# Patient Record
Sex: MALE | Race: BLACK OR AFRICAN AMERICAN | NOT HISPANIC OR LATINO | Employment: UNEMPLOYED | URBAN - METROPOLITAN AREA
[De-identification: names, ages, dates, MRNs, and addresses within clinical notes are randomized per-mention and may not be internally consistent; named-entity substitution may affect disease eponyms.]

---

## 2019-11-19 ENCOUNTER — TELEPHONE (OUTPATIENT)
Dept: OTHER | Facility: OUTPATIENT CENTER | Age: 24
End: 2019-11-19
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
## 2019-11-20 NOTE — TELEPHONE ENCOUNTER
.Per: BETTY boston/ DEENA/MA  Copay$ 0   Ded$ 0; Met$ 0   Coins 0%    Out of Pocket Max$ 0 ; Met$ 0     Psych testing require auth ([id number])? no  Extended therapy require auth ([id number])?  no    Exclusions:   Family Therapy ([id number])  NO    Marriage couple counseling  YES   Transgender/Gender Dysphoria no   CSB no   Sexual dysfunction no    Patient Contacted about benefits:  SPOKE TO PATIENT RE: INSU  Date contacted: 11/20/2019

## 2019-12-03 ENCOUNTER — TELEPHONE (OUTPATIENT)
Dept: OTHER | Facility: OUTPATIENT CENTER | Age: 24
End: 2019-12-03

## 2019-12-03 ENCOUNTER — OFFICE VISIT (OUTPATIENT)
Dept: OTHER | Facility: OUTPATIENT CENTER | Age: 24
End: 2019-12-03
Payer: COMMERCIAL

## 2019-12-03 DIAGNOSIS — F32.1 CURRENT MODERATE EPISODE OF MAJOR DEPRESSIVE DISORDER WITHOUT PRIOR EPISODE (H): Primary | ICD-10-CM

## 2019-12-03 DIAGNOSIS — F41.1 GENERALIZED ANXIETY DISORDER: ICD-10-CM

## 2019-12-03 DIAGNOSIS — F12.10 MARIJUANA ABUSE: ICD-10-CM

## 2019-12-03 NOTE — TELEPHONE ENCOUNTER
Pt requested a transfer based on feeling like they did not connect with their initial provider during their first session. This writer scheduled pt with Dr. France and let them know one transfer is typically fine with no questions asked. They were appreciative of the transfer and understood the scheduling policy for transferring providers in the future.
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
## 2019-12-04 PROBLEM — F32.1 CURRENT MODERATE EPISODE OF MAJOR DEPRESSIVE DISORDER WITHOUT PRIOR EPISODE (H): Status: ACTIVE | Noted: 2019-12-04

## 2019-12-09 ENCOUNTER — OFFICE VISIT (OUTPATIENT)
Dept: OTHER | Facility: OUTPATIENT CENTER | Age: 24
End: 2019-12-09
Payer: COMMERCIAL

## 2019-12-09 DIAGNOSIS — F41.1 GENERALIZED ANXIETY DISORDER: Primary | ICD-10-CM

## 2019-12-09 DIAGNOSIS — F32.1 CURRENT MODERATE EPISODE OF MAJOR DEPRESSIVE DISORDER WITHOUT PRIOR EPISODE (H): ICD-10-CM

## 2019-12-09 NOTE — PROGRESS NOTES
I did not personally see the patient.  I reviewed and agree with the assessment and plan as documented in this note. Bailey Moffett, PhD, LP

## 2019-12-19 ENCOUNTER — OFFICE VISIT (OUTPATIENT)
Dept: OTHER | Facility: OUTPATIENT CENTER | Age: 24
End: 2019-12-19
Payer: COMMERCIAL

## 2019-12-19 DIAGNOSIS — F32.1 CURRENT MODERATE EPISODE OF MAJOR DEPRESSIVE DISORDER WITHOUT PRIOR EPISODE (H): ICD-10-CM

## 2019-12-19 DIAGNOSIS — F41.1 GENERALIZED ANXIETY DISORDER: Primary | ICD-10-CM
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
## 2020-01-06 ENCOUNTER — OFFICE VISIT (OUTPATIENT)
Dept: OTHER | Facility: OUTPATIENT CENTER | Age: 25
End: 2020-01-06
Payer: COMMERCIAL

## 2020-01-06 DIAGNOSIS — F41.1 GENERALIZED ANXIETY DISORDER: Primary | ICD-10-CM

## 2020-01-06 DIAGNOSIS — F32.1 CURRENT MODERATE EPISODE OF MAJOR DEPRESSIVE DISORDER WITHOUT PRIOR EPISODE (H): ICD-10-CM
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
## 2020-02-12 ENCOUNTER — OFFICE VISIT (OUTPATIENT)
Dept: OTHER | Facility: OUTPATIENT CENTER | Age: 25
End: 2020-02-12
Payer: COMMERCIAL

## 2020-02-12 DIAGNOSIS — F41.1 GENERALIZED ANXIETY DISORDER: Primary | ICD-10-CM

## 2020-02-12 DIAGNOSIS — F32.1 CURRENT MODERATE EPISODE OF MAJOR DEPRESSIVE DISORDER WITHOUT PRIOR EPISODE (H): ICD-10-CM
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
## 2020-08-18 ENCOUNTER — TELEPHONE (OUTPATIENT)
Dept: OTHER | Facility: OUTPATIENT CENTER | Age: 25
End: 2020-08-18

## 2020-08-18 NOTE — TELEPHONE ENCOUNTER
----- Message from Monique France, PhD sent at 8/18/2020 11:45 AM CDT -----  Janes Nunn,     I have not met with this pt since Feb so I am not sure what this is about. Could you please let him know that he is welcome to schedule an appt in one of the open slots or be placed on the wait list?    -Devaughn    ----- Message -----  From: Arline Cox, [facility]  Sent: 8/18/2020   9:11 AM CDT  To: Monique France, PhD    Chuckie Cantor asks that you give them a call if possible in the next hour.  Will be having a discussion with their bosses today and are looking for some guidance.    Arline

## 2020-10-08 ENCOUNTER — VIRTUAL VISIT (OUTPATIENT)
Dept: PSYCHOLOGY | Facility: CLINIC | Age: 25
End: 2020-10-08
Payer: COMMERCIAL

## 2020-10-08 DIAGNOSIS — F32.0 CURRENT MILD EPISODE OF MAJOR DEPRESSIVE DISORDER WITHOUT PRIOR EPISODE (H): ICD-10-CM

## 2020-10-08 DIAGNOSIS — F41.1 GENERALIZED ANXIETY DISORDER: Primary | ICD-10-CM

## 2020-10-08 PROCEDURE — 90834 PSYTX W PT 45 MINUTES: CPT | Mod: 95 | Performed by: PSYCHOLOGIST

## 2020-10-08 PROCEDURE — 99207 PR NO CHARGE LOS: CPT | Performed by: PSYCHOLOGIST
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
## 2020-12-21 ENCOUNTER — VIRTUAL VISIT (OUTPATIENT)
Dept: PSYCHOLOGY | Facility: CLINIC | Age: 25
End: 2020-12-21
Payer: COMMERCIAL

## 2020-12-21 DIAGNOSIS — F41.1 GENERALIZED ANXIETY DISORDER: Primary | ICD-10-CM

## 2020-12-21 DIAGNOSIS — F32.0 CURRENT MILD EPISODE OF MAJOR DEPRESSIVE DISORDER WITHOUT PRIOR EPISODE (H): ICD-10-CM

## 2020-12-21 PROCEDURE — 99207 PR NO CHARGE LOS: CPT | Performed by: PSYCHOLOGIST

## 2020-12-21 PROCEDURE — 90837 PSYTX W PT 60 MINUTES: CPT | Mod: 95 | Performed by: PSYCHOLOGIST
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
## 2021-01-04 ENCOUNTER — HEALTH MAINTENANCE LETTER (OUTPATIENT)
Age: 26
End: 2021-01-04

## 2021-10-10 ENCOUNTER — HEALTH MAINTENANCE LETTER (OUTPATIENT)
Age: 26
End: 2021-10-10

## 2021-11-15 PROCEDURE — 87186 SC STD MICRODIL/AGAR DIL: CPT | Mod: ORL | Performed by: PHYSICIAN ASSISTANT

## 2021-11-15 PROCEDURE — 87070 CULTURE OTHR SPECIMN AEROBIC: CPT | Mod: ORL | Performed by: PHYSICIAN ASSISTANT

## 2021-11-17 ENCOUNTER — LAB REQUISITION (OUTPATIENT)
Dept: LAB | Facility: CLINIC | Age: 26
End: 2021-11-17
Payer: COMMERCIAL

## 2021-11-17 DIAGNOSIS — L30.8 OTHER SPECIFIED DERMATITIS: ICD-10-CM

## 2021-11-19 LAB — BACTERIA SPEC CULT: NORMAL

## 2021-11-21 LAB
BACTERIA SPEC CULT: ABNORMAL
BACTERIA SPEC CULT: ABNORMAL

## 2022-01-30 ENCOUNTER — HEALTH MAINTENANCE LETTER (OUTPATIENT)
Age: 27
End: 2022-01-30

## 2022-09-18 ENCOUNTER — HEALTH MAINTENANCE LETTER (OUTPATIENT)
Age: 27
End: 2022-09-18

## 2022-12-08 PROCEDURE — 87070 CULTURE OTHR SPECIMN AEROBIC: CPT | Mod: ORL | Performed by: PHYSICIAN ASSISTANT

## 2022-12-09 ENCOUNTER — LAB REQUISITION (OUTPATIENT)
Dept: LAB | Facility: CLINIC | Age: 27
End: 2022-12-09
Payer: COMMERCIAL

## 2022-12-09 DIAGNOSIS — L30.8 OTHER SPECIFIED DERMATITIS: ICD-10-CM

## 2022-12-12 LAB
BACTERIA WND CULT: ABNORMAL
BACTERIA WND CULT: ABNORMAL

## 2023-03-01 PROCEDURE — 87070 CULTURE OTHR SPECIMN AEROBIC: CPT | Mod: ORL | Performed by: PHYSICIAN ASSISTANT

## 2023-03-02 ENCOUNTER — LAB REQUISITION (OUTPATIENT)
Dept: LAB | Facility: CLINIC | Age: 28
End: 2023-03-02
Payer: COMMERCIAL

## 2023-03-02 DIAGNOSIS — L30.8 OTHER SPECIFIED DERMATITIS: ICD-10-CM

## 2023-03-04 LAB — BACTERIA SKIN AEROBE CULT: NORMAL

## 2023-05-07 ENCOUNTER — HEALTH MAINTENANCE LETTER (OUTPATIENT)
Age: 28
End: 2023-05-07

## 2023-10-10 ENCOUNTER — LAB REQUISITION (OUTPATIENT)
Dept: LAB | Facility: CLINIC | Age: 28
End: 2023-10-10
Payer: COMMERCIAL

## 2023-10-10 DIAGNOSIS — L30.8 OTHER SPECIFIED DERMATITIS: ICD-10-CM

## 2023-10-10 PROCEDURE — 87070 CULTURE OTHR SPECIMN AEROBIC: CPT | Mod: ORL | Performed by: PHYSICIAN ASSISTANT

## 2023-10-12 LAB — BACTERIA SPEC CULT: NORMAL

## 2024-01-10 ENCOUNTER — MEDICAL CORRESPONDENCE (OUTPATIENT)
Dept: HEALTH INFORMATION MANAGEMENT | Facility: CLINIC | Age: 29
End: 2024-01-10
Payer: COMMERCIAL

## 2024-01-10 ENCOUNTER — TRANSCRIBE ORDERS (OUTPATIENT)
Dept: OTHER | Age: 29
End: 2024-01-10

## 2024-01-10 DIAGNOSIS — Q10.0 CONGENITAL PTOSIS OF LEFT EYELID: Primary | ICD-10-CM

## 2024-01-12 NOTE — TELEPHONE ENCOUNTER
FUTURE VISIT INFORMATION      FUTURE VISIT INFORMATION:  Date: 4/16/24  Time: 8:00am  [place] [facility] [place]  REFERRAL INFORMATION:  Referring provider:  Diamond Cheung   Referring providers clinic:  [facility]  Reason for visit/diagnosis  Congenital ptosis of left eyelid     RECORDS REQUESTED FROM:       Clinic name Comments Records Status Imaging Status   [facility] Ov/referral 1/10/24   Care Everywhere

## 2024-03-25 ENCOUNTER — TELEPHONE (OUTPATIENT)
Dept: OPHTHALMOLOGY | Facility: CLINIC | Age: 29
End: 2024-03-25
Payer: COMMERCIAL

## 2024-03-25 NOTE — TELEPHONE ENCOUNTER
Spoke with patient regarding a sooner appointment from the wait-list. Patient was unable to schedule as offered.-Per Patient

## 2024-04-11 ENCOUNTER — TELEPHONE (OUTPATIENT)
Dept: OPHTHALMOLOGY | Facility: CLINIC | Age: 29
End: 2024-04-11
Payer: COMMERCIAL

## 2024-04-16 ENCOUNTER — PRE VISIT (OUTPATIENT)
Dept: OPHTHALMOLOGY | Facility: CLINIC | Age: 29
End: 2024-04-16

## 2024-05-08 ENCOUNTER — TELEPHONE (OUTPATIENT)
Dept: OPHTHALMOLOGY | Facility: CLINIC | Age: 29
End: 2024-05-08

## 2024-05-08 ENCOUNTER — OFFICE VISIT (OUTPATIENT)
Dept: OPHTHALMOLOGY | Facility: CLINIC | Age: 29
End: 2024-05-08
Payer: COMMERCIAL

## 2024-05-08 DIAGNOSIS — H04.123 DRY EYE SYNDROME OF BOTH EYES: ICD-10-CM

## 2024-05-08 DIAGNOSIS — Q10.0 CONGENITAL PTOSIS, RIGHT: Primary | ICD-10-CM

## 2024-05-08 DIAGNOSIS — Q10.0 CONGENITAL PTOSIS OF LEFT EYELID: ICD-10-CM

## 2024-05-08 PROCEDURE — 99204 OFFICE O/P NEW MOD 45 MIN: CPT | Performed by: OPHTHALMOLOGY

## 2024-05-08 PROCEDURE — 92081 LIMITED VISUAL FIELD XM: CPT | Performed by: OPHTHALMOLOGY

## 2024-05-08 PROCEDURE — 92285 EXTERNAL OCULAR PHOTOGRAPHY: CPT | Performed by: OPHTHALMOLOGY

## 2024-05-08 RX ORDER — ALBUTEROL SULFATE 90 UG/1
1-2 AEROSOL, METERED RESPIRATORY (INHALATION) EVERY 6 HOURS PRN
COMMUNITY
Start: 2024-01-05

## 2024-05-08 ASSESSMENT — VISUAL ACUITY
OD_SC: 20/20
METHOD: SNELLEN - LINEAR
OS_SC: 20/20

## 2024-05-08 ASSESSMENT — CONF VISUAL FIELD
OD_SUPERIOR_NASAL_RESTRICTION: 0
OD_INFERIOR_TEMPORAL_RESTRICTION: 0
OD_SUPERIOR_TEMPORAL_RESTRICTION: 0
COMMENTS: TANGENT VISUAL FIELD WAS PERFORMED TODAY
OD_INFERIOR_NASAL_RESTRICTION: 0

## 2024-05-08 ASSESSMENT — TONOMETRY
OD_IOP_MMHG: 10
IOP_METHOD: ICARE
OS_IOP_MMHG: 8

## 2024-05-08 NOTE — TELEPHONE ENCOUNTER
[facility] Call Center    Phone Message    May a detailed message be left on voicemail: yes     Reason for Call: Symptoms or Concerns     If patient has red-flag symptoms, warm transfer to triage line    Current symptom or concern: 2 different sized pupils. Left pupil is way smaller than right    Symptoms have been present for: 1 hour(s)    Has patient previously been seen for this? No    By : n/a    Date: n/a    Are there any new or worsening symptoms? No    Action Taken: Message routed to:  [facility] eye    Travel Screening: Not Applicable

## 2024-05-08 NOTE — PROGRESS NOTES
Oculoplastic Clinic New Patient    Patient: Chuckie Manzano MRN# [id number]   YOB: 1995 Age: 29 year old   Date of Visit: May 8, 2024    CC: Droopy eyelids obstructing vision.              HPI:     Chief Complaint(s) and History of Present Illness(es)     Ptosis Evaluation           Comments    Pt referred by Dr. Cheung for evaluation of congenital ptosis AMPARO. Pt states it has been that way since he was a young child, and always evident in photos. More recently, he has become aware of more significant drooping   of this lid when he is tired or drinks alcoholic beverages.     Pt also has tearing concerns. Dr. Cheung recommended that he be evaluated for this as well. Pt states that his eyes tear, the tears overflow down   his cheek, and his nose runs. He adds that his eyes appear more hazy when   looking at himself in the mirror when he is symptomatic.     He also feels his eye color is changing, and would like a referral to have   this evaluated if appropriate.    No blood thinners. No previous ocular trauma or surgeries.          Chuckie Manzano is a 29 year old male who has noted gradual onset of droopy eyelids over the past years, but he was born with droopy eyelids as well, left worse than right, but over the past several years it is getting worse. He has attributed some of this significant stress. The droopy eyelid is interfering with activities of daily living including driving, and reading. He also finds it hard to see at night. The patient denies double vision, variability of the eyelid position. He has dry irritated eyes, and has used Visine in the past, but was told by Dr. Cheung not to.     Denies trauma to the eye.   EXAM:     MRD1: 1.5 mm right eye 0.5 mm left eye   Good levator function     VISUAL FIELD:  Right eye untaped:32 degrees Right eye taped:50 degrees  Left eye untaped:30 degrees Left eye taped:50 degrees    Assessment & Plan     Chuckie Manzano is a 29 year old male with  the following diagnoses:   1. Congenital ptosis, right    2. Congenital ptosis of left eyelid    3. Dry eye syndrome of both eyes       I think his epiphora is related more to reflex tearing. We didn't perform lacrimal irrigation, but I asked him to use artificial tears and warm compresses.     Plan: Both upper eyelid ptosis repair posterior approach 8 mm right eye and 8+2 mm left eye. 90020    The heavy upper eyelids are causing symptoms as documented above. The condition is not secondary to underlying Sjogren's, myasthenia gravis, or polymyositis.   ANTICOAGULATION:         PHOTOS DEMONSTRATE:    Blepharoptosis left worse than right.   Attending Physician Attestation: Complete documentation of historical and exam elements from today's encounter can be found in the full encounter summary report (not reduplicated in this progress note). I personally obtained the chief complaint(s) and history of present illness. I confirmed and edited as necessary the review of systems, past medical/surgical history, family history, social history, and examination findings as documented by others; and I examined the patient myself. I personally reviewed the relevant tests, images, and reports as documented above. I formulated and edited as necessary the assessment and plan and discussed the findings and management plan with the patient. Ina Moura MD      Today with Chuckie Manzano I reviewed the indications, risks, benefits, and alternatives of the proposed surgical procedure including, but not limited to, failure obtain the desired result  and need for additional surgery, bleeding, infection, loss of vision, or injury to the eye, dry eyes, and the remote possibility of permanent damage to any organ system or death with the use of anesthesia.  I provided multiple opportunities for the questions, answered all questions to the best of my ability, and confirmed that my answers and my discussion were understood.

## 2024-05-08 NOTE — NURSING NOTE
Chief Complaints and History of Present Illnesses   Patient presents with    Ptosis Evaluation     Chief Complaint(s) and History of Present Illness(es)       Ptosis Evaluation               Comments    Pt referred by Dr. Cheung for evaluation of congenital ptosis AMPARO. Pt states it has been that way since he was a young child, and always evident in photos. More recently, he has become aware of more significant drooping of this lid when he is tired or drinks alcoholic beverages.     Pt also has tearing concerns. Dr. Cheung recommended that he be evaluated for this as well. Pt states that his eyes tear, the tears overflow down his cheek, and his nose runs. He adds that his eyes appear more hazy when looking at himself in the mirror when he is symptomatic.     He also feels his eye color is changing, and would like a referral to have this evaluated if appropriate.    No blood thinners. No previous ocular trauma or surgeries.

## 2024-05-08 NOTE — LETTER
2024         RE:  :  MRN: Chuckie Manzano  1995  [id number]     Dear Dr. Cheung,    Thank you for asking me to see your patient, Chuckie Manzano, for an oculoplastic   consultation.  My assessment and plan are below.  For further details, please see my attached clinic note.           HPI:     Chief Complaint(s) and History of Present Illness(es)     Ptosis Evaluation           Comments    Pt referred by Dr. Cheung for evaluation of congenital ptosis AMPARO. Pt states it has been that way since he was a young child, and always evident in photos. More recently, he has become aware of more significant drooping   of this lid when he is tired or drinks alcoholic beverages.     Pt also has tearing concerns. Dr. Cheung recommended that he be evaluated for this as well. Pt states that his eyes tear, the tears overflow down   his cheek, and his nose runs. He adds that his eyes appear more hazy when   looking at himself in the mirror when he is symptomatic.     He also feels his eye color is changing, and would like a referral to have   this evaluated if appropriate.    No blood thinners. No previous ocular trauma or surgeries.          Chuckie Manzano is a 29 year old male who has noted gradual onset of droopy eyelids over the past years, but he was born with droopy eyelids as well, left worse than right, but over the past several years it is getting worse. He has attributed some of this significant stress. The droopy eyelid is interfering with activities of daily living including driving, and reading. He also finds it hard to see at night. The patient denies double vision, variability of the eyelid position. He has dry irritated eyes, and has used Visine in the past, but was told by Dr. Cheung not to.     Denies trauma to the eye.   EXAM:     MRD1: 1.5 mm right eye 0.5 mm left eye   Good levator function     VISUAL FIELD:  Right eye untaped:32 degrees Right eye taped:50 degrees  Left eye  untaped:30 degrees Left eye taped:50 degrees    Assessment & Plan     Chucike Manzano is a 29 year old male with the following diagnoses:   1. Congenital ptosis, right    2. Congenital ptosis of left eyelid    3. Dry eye syndrome of both eyes       I think his epiphora is related more to reflex tearing. We didn't perform lacrimal irrigation, but I asked him to use artificial tears and warm compresses.     Plan: Both upper eyelid ptosis repair posterior approach 8 mm right eye and 8+1 mm left eye. [id number]    The heavy upper eyelids are causing symptoms as documented above. The condition is not secondary to underlying Sjogren's, myasthenia gravis, or polymyositis.   ANTICOAGULATION:        Again, thank you for allowing me to participate in the care of your patient.      Sincerely,    Ina Moura MD  Department of Ophthalmology and Visual Neurosciences  [facility]    CC: Diamond Cheung  [address] [place] [address]  Via Fax: [phone]

## 2024-05-08 NOTE — TELEPHONE ENCOUNTER
Spoke with pt. Dr. Buckley used phenylephrine to evaluate his ptosis/eyelid muscles, it may be that more of the drop got in to one eye which is causing one side to be more dilated. Advised pt he may experience blurry vision on the side with the larger pupil and that it should gradually go back to normal as the day/night goes on. Recommended calling clinic back tomorrow if things to normalize overnight.     Karly Mello, COT, 3:31 PM 05/08/2024

## 2024-07-14 ENCOUNTER — HEALTH MAINTENANCE LETTER (OUTPATIENT)
Age: 29
End: 2024-07-14

## 2024-07-18 ENCOUNTER — ANESTHESIA EVENT (OUTPATIENT)
Dept: SURGERY | Facility: AMBULATORY SURGERY CENTER | Age: 29
End: 2024-07-18
Payer: COMMERCIAL

## 2024-07-19 NOTE — ANESTHESIA PREPROCEDURE EVALUATION
"Anesthesia Pre-Procedure Evaluation    Patient: Chuckie Manzano   MRN: [id number] : 1995        Procedure : Procedure(s):  Bilateral upper eyelid ptosis repair          Past Medical History:   Diagnosis Date    Asthma     Eczema       History reviewed. No pertinent surgical history.   Allergies   Allergen Reactions    Peanut (Diagnostic) Dermatitis     Causes burning of skin - immediate sores in mouth and on lips    Sulfa Antibiotics Dermatitis     Sulfa caused severe flares of eczema as an infant    Doxycycline Nausea and Vomiting    Trifluoperazine Swelling and Rash      Social History     Tobacco Use    Smoking status: Never    Smokeless tobacco: Never   Substance Use Topics    Alcohol use: Not on file      Wt Readings from Last 1 Encounters:   24 74.9 kg (165 lb 2 oz)              OUTSIDE LABS:  CBC: No results found for: \"WBC\", \"HGB\", \"HCT\", \"PLT\"  BMP: No results found for: \"NA\", \"POTASSIUM\", \"CHLORIDE\", \"CO2\", \"BUN\", \"CR\", \"GLC\"  COAGS: No results found for: \"PTT\", \"INR\", \"FIBR\"  POC: No results found for: \"BGM\", \"HCG\", \"HCGS\"  HEPATIC: No results found for: \"ALBUMIN\", \"PROTTOTAL\", \"ALT\", \"AST\", \"GGT\", \"ALKPHOS\", \"BILITOTAL\", \"BILIDIRECT\", \"PRANEETH\"  OTHER: No results found for: \"PH\", \"LACT\", \"A1C\", \"MARQUEZ\", \"PHOS\", \"MAG\", \"LIPASE\", \"AMYLASE\", \"TSH\", \"T4\", \"T3\", \"CRP\", \"SED\"    Anesthesia Plan    ASA Status:  2       Anesthesia Type: MAC.     - Reason for MAC: straight local not clinically adequate   Induction: Intravenous, Propofol.   Maintenance: TIVA.        Consents    Anesthesia Plan(s) and associated risks, benefits, and realistic alternatives discussed. Questions answered and patient/representative(s) expressed understanding.     - Discussed: Risks, Benefits and Alternatives for BOTH SEDATION and the PROCEDURE were discussed     - Discussed with:       - Extended Intubation/Ventilatory Support Discussed: No.      - Patient is DNR/DNI Status: No     Use of blood products discussed: No . "     Postoperative Care    Pain management: IV analgesics, Oral pain medications, Multi-modal analgesia.   PONV prophylaxis: Dexamethasone or Solumedrol, Ondansetron (or other 5HT-3), Background Propofol Infusion     Comments:               Darrell Gordon MD    I have reviewed the pertinent notes and labs in the chart from the past 30 days and (re)examined the patient.  Any updates or changes from those notes are reflected in this note.

## 2024-07-22 ENCOUNTER — HOSPITAL ENCOUNTER (OUTPATIENT)
Facility: AMBULATORY SURGERY CENTER | Age: 29
Discharge: HOME | End: 2024-07-22
Attending: OPHTHALMOLOGY | Admitting: OPHTHALMOLOGY
Payer: COMMERCIAL

## 2024-07-22 ENCOUNTER — ANESTHESIA (OUTPATIENT)
Dept: SURGERY | Facility: AMBULATORY SURGERY CENTER | Age: 29
End: 2024-07-22
Payer: COMMERCIAL

## 2024-07-22 VITALS
DIASTOLIC BLOOD PRESSURE: 89 MMHG | RESPIRATION RATE: 16 BRPM | SYSTOLIC BLOOD PRESSURE: 133 MMHG | TEMPERATURE: 97.3 F | WEIGHT: 165.13 LBS | OXYGEN SATURATION: 97 % | HEART RATE: 74 BPM

## 2024-07-22 DIAGNOSIS — Q10.0 CONGENITAL PTOSIS, RIGHT: ICD-10-CM

## 2024-07-22 DIAGNOSIS — Z98.890 POSTOPERATIVE EYE STATE: Primary | ICD-10-CM

## 2024-07-22 DIAGNOSIS — Q10.0 CONGENITAL PTOSIS OF LEFT EYELID: ICD-10-CM

## 2024-07-22 PROCEDURE — G8907 PT DOC NO EVENTS ON DISCHARG: HCPCS

## 2024-07-22 PROCEDURE — 67903 REPAIR EYELID DEFECT: CPT | Performed by: NURSE ANESTHETIST, CERTIFIED REGISTERED

## 2024-07-22 PROCEDURE — 67908 REPAIR EYELID DEFECT: CPT | Mod: E3

## 2024-07-22 PROCEDURE — 67903 REPAIR EYELID DEFECT: CPT | Mod: 50 | Performed by: OPHTHALMOLOGY

## 2024-07-22 PROCEDURE — 67903 REPAIR EYELID DEFECT: CPT | Performed by: STUDENT IN AN ORGANIZED HEALTH CARE EDUCATION/TRAINING PROGRAM

## 2024-07-22 PROCEDURE — G8918 PT W/O PREOP ORDER IV AB PRO: HCPCS

## 2024-07-22 RX ORDER — FENTANYL CITRATE 50 UG/ML
INJECTION, SOLUTION INTRAMUSCULAR; INTRAVENOUS PRN
Status: DISCONTINUED | OUTPATIENT
Start: 2024-07-22 | End: 2024-07-22

## 2024-07-22 RX ORDER — PROPOFOL 10 MG/ML
INJECTION, EMULSION INTRAVENOUS PRN
Status: DISCONTINUED | OUTPATIENT
Start: 2024-07-22 | End: 2024-07-22

## 2024-07-22 RX ORDER — SODIUM CHLORIDE, SODIUM LACTATE, POTASSIUM CHLORIDE, CALCIUM CHLORIDE 600; 310; 30; 20 MG/100ML; MG/100ML; MG/100ML; MG/100ML
INJECTION, SOLUTION INTRAVENOUS CONTINUOUS
Status: DISCONTINUED | OUTPATIENT
Start: 2024-07-22 | End: 2024-07-23 | Stop reason: HOSPADM

## 2024-07-22 RX ORDER — OXYCODONE HYDROCHLORIDE 5 MG/1
10 TABLET ORAL
Status: DISCONTINUED | OUTPATIENT
Start: 2024-07-22 | End: 2024-07-23 | Stop reason: HOSPADM

## 2024-07-22 RX ORDER — ONDANSETRON 4 MG/1
4 TABLET, ORALLY DISINTEGRATING ORAL EVERY 30 MIN PRN
Status: DISCONTINUED | OUTPATIENT
Start: 2024-07-22 | End: 2024-07-23 | Stop reason: HOSPADM

## 2024-07-22 RX ORDER — ERYTHROMYCIN 5 MG/G
OINTMENT OPHTHALMIC PRN
Status: DISCONTINUED | OUTPATIENT
Start: 2024-07-22 | End: 2024-07-22 | Stop reason: HOSPADM

## 2024-07-22 RX ORDER — LIDOCAINE 40 MG/G
CREAM TOPICAL
Status: DISCONTINUED | OUTPATIENT
Start: 2024-07-22 | End: 2024-07-23 | Stop reason: HOSPADM

## 2024-07-22 RX ORDER — ACETAMINOPHEN 325 MG/1
975 TABLET ORAL ONCE
Status: COMPLETED | OUTPATIENT
Start: 2024-07-22 | End: 2024-07-22

## 2024-07-22 RX ORDER — NEOMYCIN POLYMYXIN B SULFATES AND DEXAMETHASONE 3.5; 10000; 1 MG/ML; [USP'U]/ML; MG/ML
SUSPENSION/ DROPS OPHTHALMIC
Qty: 5 ML | Refills: 0 | Status: SHIPPED | OUTPATIENT
Start: 2024-07-22 | End: 2024-09-04

## 2024-07-22 RX ORDER — TETRACAINE HYDROCHLORIDE 5 MG/ML
SOLUTION OPHTHALMIC PRN
Status: DISCONTINUED | OUTPATIENT
Start: 2024-07-22 | End: 2024-07-22 | Stop reason: HOSPADM

## 2024-07-22 RX ORDER — ONDANSETRON 2 MG/ML
4 INJECTION INTRAMUSCULAR; INTRAVENOUS EVERY 30 MIN PRN
Status: DISCONTINUED | OUTPATIENT
Start: 2024-07-22 | End: 2024-07-23 | Stop reason: HOSPADM

## 2024-07-22 RX ORDER — OXYCODONE HYDROCHLORIDE 5 MG/1
5 TABLET ORAL EVERY 6 HOURS PRN
Qty: 12 TABLET | Refills: 0 | Status: SHIPPED | OUTPATIENT
Start: 2024-07-22 | End: 2024-07-25

## 2024-07-22 RX ORDER — NALOXONE HYDROCHLORIDE 0.4 MG/ML
0.1 INJECTION, SOLUTION INTRAMUSCULAR; INTRAVENOUS; SUBCUTANEOUS
Status: DISCONTINUED | OUTPATIENT
Start: 2024-07-22 | End: 2024-07-23 | Stop reason: HOSPADM

## 2024-07-22 RX ORDER — DEXAMETHASONE SODIUM PHOSPHATE 4 MG/ML
4 INJECTION, SOLUTION INTRA-ARTICULAR; INTRALESIONAL; INTRAMUSCULAR; INTRAVENOUS; SOFT TISSUE
Status: DISCONTINUED | OUTPATIENT
Start: 2024-07-22 | End: 2024-07-23 | Stop reason: HOSPADM

## 2024-07-22 RX ORDER — OXYCODONE HYDROCHLORIDE 5 MG/1
5 TABLET ORAL
Status: COMPLETED | OUTPATIENT
Start: 2024-07-22 | End: 2024-07-22

## 2024-07-22 RX ORDER — LIDOCAINE HYDROCHLORIDE 20 MG/ML
INJECTION, SOLUTION INFILTRATION; PERINEURAL PRN
Status: DISCONTINUED | OUTPATIENT
Start: 2024-07-22 | End: 2024-07-22

## 2024-07-22 RX ADMIN — ACETAMINOPHEN 975 MG: 325 TABLET ORAL at 07:54

## 2024-07-22 RX ADMIN — OXYCODONE HYDROCHLORIDE 5 MG: 5 TABLET ORAL at 10:15

## 2024-07-22 RX ADMIN — LIDOCAINE HYDROCHLORIDE 60 MG: 20 INJECTION, SOLUTION INFILTRATION; PERINEURAL at 09:26

## 2024-07-22 RX ADMIN — SODIUM CHLORIDE, SODIUM LACTATE, POTASSIUM CHLORIDE, CALCIUM CHLORIDE: 600; 310; 30; 20 INJECTION, SOLUTION INTRAVENOUS at 08:03

## 2024-07-22 RX ADMIN — FENTANYL CITRATE 25 MCG: 50 INJECTION, SOLUTION INTRAMUSCULAR; INTRAVENOUS at 09:24

## 2024-07-22 RX ADMIN — PROPOFOL 50 MG: 10 INJECTION, EMULSION INTRAVENOUS at 09:27

## 2024-07-22 RX ADMIN — PROPOFOL 150 MG: 10 INJECTION, EMULSION INTRAVENOUS at 09:26

## 2024-07-22 NOTE — DISCHARGE INSTRUCTIONS
You had 975 mg of Tylenol at 8 AM. You may repeat this after 2 PM. Maximum amount of Tylenol/Acetaminophen in a 24 hour period is 4,000 mg.      Post-operative Instructions  Ophthalmic Plastic and Reconstructive Surgery    Ina Moura M.D.     All instructions apply to the operated eye(s) or eyelid(s).    Wound care and personal care  ? Apply ice compresses and gentle pressure 15 minutes on, 15 minutes off, for 2 days. If you are sleeping, you don't need to wake up to ice. As long as there is no further bleeding, after two days, switch to warm water compresses for five minutes, four times a day until seen by your physician.   ? You may shower or wash your hair the day after surgery. Do not go swimming for at least 2 weeks to prevent contamination of your wounds.  ? You may go for walks and light activity is ok, but no heavy (over 15 pounds) lifting, bending or excessive straining for one week.   ? Do not apply make-up to the eyes or eyelids for 2 weeks after surgery.  ? Expect some swelling, bruising, black eye (even into the lower eyelids and cheeks). Also expect serum caking, crusting and discharge from the eye and/or incisions. A small amount of surface bleeding, and depending on the type of surgery, bleeding from the inside of the eyelid, is normal for the first 48 hours.  ? Avoid straining, bending at the waist, or lifting more than 15 pounds for 1 week. Sleeping with your head elevated, such as in a recliner, for the first several days can help swelling resolve more quickly.   ? Do continue to ambulate (walk) as you normally would - being sedentary after surgery can cause blood clots.   ? Your eye(s) and eyelid(s) may be painful and tender. This is normal after surgery.      Contact information and follow-up  ? Return to the Eye Clinic for a follow-up appointment with your physician as scheduled. If no appointment has been scheduled:   - [facility] eye clinic: [phone] for an appointment  with Dr. Moura within 2 to 3 weeks from your date of surgery.    After hours or on weekends and holidays, call [phone] and ask to speak with the ophthalmologist on call.    An on call person can be reached after hours for concerns. The on call doctor should not call in medication refill requests after hours or on weekends, so please plan accordingly. An effort has been made to provide adequate pain medications following every surgery, and refills will not be provided in most instances.     Medications  ? Restart all regular home medications and eye drops. If you take Plavix or Aspirin on a regular basis, wait for 72 hours after your surgery before restarting these in order to decrease the risk of bleeding complications.  ? Avoid aspirin and aspirin-like medications (Motrin, Aleve, Ibuprofen, Humaira-Annabella etc) for 72 hours to reduce the risk of bleeding. You may take Tylenol (acetaminophen) for pain.  ? In addition to your home medications, take the following post-operative medications as prescribed by your physician.    ? Instill prescribed eye drops 3 times a day for 10 days.   ? If you have ocular irritation, you can use over the counter artificial tears such as Refresh, Systane, or Blink. Do not use Visine, Clear Eyes, or any other drop that gets the red out.

## 2024-07-22 NOTE — ANESTHESIA POSTPROCEDURE EVALUATION
Patient: Chuckie Manzano    Procedure: Procedure(s):  Bilateral upper eyelid ptosis repair       Anesthesia Type:  MAC    Note:  Disposition: Outpatient   Postop Pain Control: Uneventful            Sign Out: Well controlled pain   PONV: No   Neuro/Psych: Uneventful            Sign Out: Acceptable/Baseline neuro status   Airway/Respiratory: Uneventful            Sign Out: Acceptable/Baseline resp. status   CV/Hemodynamics: Uneventful            Sign Out: Acceptable CV status; No obvious hypovolemia; No obvious fluid overload   Other NRE:    DID A NON-ROUTINE EVENT OCCUR?            Last vitals:  Vitals Value Taken Time   /89 07/22/24 1045   Temp 97.3  F (36.3  C) 07/22/24 0955   Pulse 74 07/22/24 1045   Resp 16 07/22/24 0955   SpO2 97 % 07/22/24 1045       Electronically Signed By: Darrell Gordon MD  July 22, 2024  11:19 AM

## 2024-07-22 NOTE — BRIEF OP NOTE
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  Complications: None.  Implants: * No implants in log *

## 2024-07-22 NOTE — OP NOTE
PREOPERATIVE DIAGNOSIS: Bilateral upper eyelid ptosis.   POSTOPERATIVE DIAGNOSIS:  Bilateral upper eyelid ptosis.   PROCEDURE: Bilateral upper eyelid ptosis repair - posterior approach 8 mm advancement right eye and 8 mm plus 2 mm superior tarsectomy left.   SURGEON: Ina Moura MD  ASSISTANT: Moraima Becker MD  ANESTHESIA: Monitored with local infiltration of a 50/50 mixture of 2% lidocaine with epinephrine and 0.5% Marcaine.   COMPLICATIONS: None.   ESTIMATED BLOOD LOSS: 1 mL   HISTORY: Chuckie Manzano presented with upper lid ptosis interfering with the superior visual field and activities of daily living. After the risks, benefits and alternatives to the proposed procedure were explained, informed consent was obtained.   PROCEDURE: The patient was brought to the operating room and placed supine on the operating table. IV sedation was given. The bilateral upper eyelid was infiltrated with local anesthetic and  was prepped and draped in the typical sterile ophthalmic fashion. Atttention was directed to the right  side.  A 4-0 silk suture was placed through the eyelid margin and the eyelid everted over a Desmarres retractor. A 6-0 silk suture was then threaded through the conjunctiva, levator aponeurosis and King's muscle 4 mm from the superior tarsal border in order to excise a total of 8 mm of  muscle and conjunctiva. These sutures were used to elevate the conjunctiva and King's muscle. The Putterman clamp was used and clamped over the elevated tissues. A 6-0 plain gut suture was run in a horizontal mattress fashion 1 mm below the clamp from lateral to medial, then medial to lateral. The elevated tissues were excised with a 15 blade. The sutures were then externalized and tied in the lid crease, effectively advancing the King's muscle, levator aponeurosis, and conjunctiva. The 4-0 silk suture was removed. The lid was reverted to its normal position and ophthalmic antibiotic ointment placed in the  eye.   Attention was directed to the left side where the same procedure was performed, except on this side, 2 mm of superior tarsal plate was also marked out and excised, effectively advancing 2 mm of tarsal plate plus 8 mm of levator aponeurosis, mullers muscle and conjunctiva from a posterior approach. The patient tolerated the procedure well and left the operating room in stable condition.   Ina Moura MD

## 2024-07-22 NOTE — ANESTHESIA CARE TRANSFER NOTE
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    Anesthesia Type:   MAC     Note:    Oropharynx: oropharynx clear of all foreign objects  Level of Consciousness: awake  Oxygen Supplementation: room air    Independent Airway: airway patency satisfactory and stable  Dentition: dentition unchanged  Vital Signs Stable: post-procedure vital signs reviewed and stable  Report to RN Given: handoff report given  Patient transferred to: Phase II  Comments: To Phase II. Report to RN.  VSS Resp status stable.  Handoff Report: Identifed the Patient, Identified the Reponsible Provider, Reviewed the pertinent medical history, Discussed the surgical course, Reviewed Intra-OP anesthesia mangement and issues during anesthesia, Set expectations for post-procedure period and Allowed opportunity for questions and acknowledgement of understanding      Vitals:  Vitals Value Taken Time   BP     Temp 97.3  F (36.3  C) 07/22/24 0955   Pulse 75 07/22/24 0955   Resp 16 07/22/24 0955   SpO2 99 % 07/22/24 0955       Electronically Signed By: LAURA Joyce CRNA  July 22, 2024  9:56 AM

## 2024-07-30 ENCOUNTER — OFFICE VISIT (OUTPATIENT)
Dept: OPHTHALMOLOGY | Facility: CLINIC | Age: 29
End: 2024-07-30
Payer: COMMERCIAL

## 2024-07-30 DIAGNOSIS — Z98.890 POSTOPERATIVE EYE STATE: Primary | ICD-10-CM

## 2024-07-30 PROCEDURE — 99024 POSTOP FOLLOW-UP VISIT: CPT | Performed by: OPHTHALMOLOGY

## 2024-07-30 ASSESSMENT — CONF VISUAL FIELD
OD_NORMAL: 1
OD_SUPERIOR_TEMPORAL_RESTRICTION: 0
OS_NORMAL: 1
OS_INFERIOR_NASAL_RESTRICTION: 0
METHOD: COUNTING FINGERS
OD_INFERIOR_NASAL_RESTRICTION: 0
OS_SUPERIOR_TEMPORAL_RESTRICTION: 0
OS_INFERIOR_TEMPORAL_RESTRICTION: 0
OD_INFERIOR_TEMPORAL_RESTRICTION: 0
OS_SUPERIOR_NASAL_RESTRICTION: 0
OD_SUPERIOR_NASAL_RESTRICTION: 0

## 2024-07-30 ASSESSMENT — EXTERNAL EXAM - RIGHT EYE: OD_EXAM: NORMAL

## 2024-07-30 ASSESSMENT — TONOMETRY
OD_IOP_MMHG: 12
OS_IOP_MMHG: 13
IOP_METHOD: ICARE

## 2024-07-30 ASSESSMENT — VISUAL ACUITY
METHOD: SNELLEN - LINEAR
OD_SC+: -1
OD_SC: 20/20
OS_SC: 20/20

## 2024-07-30 ASSESSMENT — SLIT LAMP EXAM - LIDS: COMMENTS: MILD UL EDEMA

## 2024-07-30 ASSESSMENT — EXTERNAL EXAM - LEFT EYE: OS_EXAM: NORMAL

## 2024-07-30 NOTE — NURSING NOTE
Chief Complaints and History of Present Illnesses   Patient presents with    Follow Up     S/p Bilateral upper eyelid ptosis repair 7/22/24           Chief Complaint(s) and History of Present Illness(es)       Follow Up              Laterality: both eyes    Treatments tried: eye drops    Pain scale: 1/10    Comments: S/p Bilateral upper eyelid ptosis repair 7/22/24                    Comments    The brusing has gone done. Things seem to be going fine.   Did have an issues 2 days after surgery when opening oven the heat of the oven seemed to fog up vision left eye. Finds the need to blink more since then to clear things up with left eye. Left eye does feel a little drys. Each eye do feel a little scratching with closing each eye at night.   Would like to get the OK to go back to the gym.     Has not used any ointment but continues to use the eye drops each eye.     JOSE Doan 10:14 AM July 30, 2024

## 2024-07-30 NOTE — PATIENT INSTRUCTIONS
"- Apply warm compresses for 1 minute two to three times a day until your bruising has resolved. You can continue this for one more month.   - Cool compresses can help with swelling and itching, you can alternate cool compresses with warm compresses if you find it helpful.  - Apply Aquaphor or Vaseline to the incision at bedtime until it is smooth.    - The neomycin/polymixin/dexamethasone drop should be stopped at day 10.   - If you have symptoms of eye irritation, it is good to use over the counter artificial tears. Good brands include Refresh, Blink, and Systane. Do NOT get drops that are for \"red eyes.\"   - It is normal for the incision to appear raised, red, itch and have small lumps. You can gently massage any small bumps along the incision line. These can take up to six months to resolve.    "

## 2024-07-30 NOTE — PROGRESS NOTES
"Chief Complaint(s) and History of Present Illness(es)     Follow Up            Laterality: both eyes    Treatments tried: eye drops    Pain scale: 1/10    Comments: S/p Bilateral upper eyelid ptosis repair 7/22/24      Comments    The brusing has gone done. Things seem to be going fine.   Did have an issues 2 days after surgery when opening oven the heat of the oven seemed to fog up vision left eye. Finds the need to blink more since then to clear things up with left eye. Left eye does feel a little drys.   Each eye do feel a little scratching with closing each eye at night.   Would like to get the OK to go back to the gym.     Has not used any ointment but continues to use the eye drops each eye.     Adamaris Rudd, COT COT 10:14 AM July 30, 2024        Doing well. Happy with outcome.    Patient Instructions   - Apply warm compresses for 1 minute two to three times a day until your bruising has resolved. You can continue this for one more month.   - Cool compresses can help with swelling and itching, you can alternate cool compresses with warm compresses if you find it helpful.  - Apply Aquaphor or Vaseline to the incision at bedtime until it is smooth.    - The neomycin/polymixin/dexamethasone drop should be stopped at day 10.   - If you have symptoms of eye irritation, it is good to use over the counter artificial tears. Good brands include Refresh, Blink, and Systane. Do NOT get drops that are for \"red eyes.\"   - It is normal for the incision to appear raised, red, itch and have small lumps. You can gently massage any small bumps along the incision line. These can take up to six months to resolve.    Return in about 2 months (around 9/30/2024) for Follow Up.      I did not see Chuckie but agree with the impression and plan. Ina Moura MD       "

## 2024-08-29 ENCOUNTER — TELEPHONE (OUTPATIENT)
Dept: OPHTHALMOLOGY | Facility: CLINIC | Age: 29
End: 2024-08-29
Payer: COMMERCIAL

## 2024-08-29 NOTE — TELEPHONE ENCOUNTER
"[facility] Call Center    Phone Message    May a detailed message be left on voicemail: yes     Reason for Call: Other: Patient called requesting a call back. He is wondering if he can stop into the clinic to get samples of \"upneeq\" Please call patient to advise. Thanks.      Action Taken: Other: eye    Travel Screening: Not Applicable                                                                         "

## 2024-08-30 NOTE — TELEPHONE ENCOUNTER
LM for pt do discuss sample request. Pt had ptosis repair with Dr. Buckley on 7/22/24, last PO was 7/30/24. Would like to further discuss with pt his symptoms and possibility of coming in for PO/re eval.     Karly Mello, COT, 10:34 AM 08/30/2024

## 2024-08-30 NOTE — TELEPHONE ENCOUNTER
[facility] Call Center    Phone Message    May a detailed message be left on voicemail: yes     Reason for Call: Other: Patient is returning the clinics call to discuss the sample request.  He states the easiest way to reach him is [phone].  Thank you!      Action Taken: Message routed to:  [facility] Eye     Travel Screening: Not Applicable     Date of Service:

## 2024-09-03 NOTE — TELEPHONE ENCOUNTER
Spoke with patient, he is noticing more drooping of both eyes. Right more than left. Will come in for PO check tomorrow 9/4 prior to possibly starting upneeq.    Karly Mello, COT, 11:04 AM 09/03/2024

## 2024-09-04 ENCOUNTER — OFFICE VISIT (OUTPATIENT)
Dept: OPHTHALMOLOGY | Facility: CLINIC | Age: 29
End: 2024-09-04
Payer: COMMERCIAL

## 2024-09-04 DIAGNOSIS — Q10.0 CONGENITAL PTOSIS OF LEFT EYELID: Primary | ICD-10-CM

## 2024-09-04 DIAGNOSIS — Q10.0 CONGENITAL PTOSIS, RIGHT: ICD-10-CM

## 2024-09-04 PROCEDURE — 99024 POSTOP FOLLOW-UP VISIT: CPT | Performed by: OPHTHALMOLOGY

## 2024-09-04 ASSESSMENT — VISUAL ACUITY
OD_SC: 20/20
OD_SC+: -1
OS_SC: 20/20
METHOD: SNELLEN - LINEAR

## 2024-09-04 ASSESSMENT — TONOMETRY
IOP_METHOD: ICARE
OS_IOP_MMHG: 14
OD_IOP_MMHG: 13

## 2024-09-04 NOTE — PROGRESS NOTES
Chief Complaint(s) and History of Present Illness(es)     Post Op (Ophthalmology) Both Eyes           Comments    S/P Bilateral upper eyelid ptosis repair 7/22/2024. Pt states that the   right upper lid has never sat as high as the left upper, but he feels that   both of the eyelids have begun to droop again. He states he would like to   get some samples of Upneeq if appropriate today.    Pt feels vision is fine, using Blink igtt each eye PRN          Chuckie Manzano is about 1 month post both upper eyelid ptosis repair. Good symmetry with margin to reflex distance about 3 mm both eyes.    He is doing well.    He will follow up with me on an as needed basis. He would like to try Upneeq for social events.     Complete documentation of historical and exam elements from today's encounter can be found in the full encounter summary report (not reduplicated in this progress note). I personally obtained the chief complaint(s) and history of present illness.  I confirmed and edited as necessary the review of systems, past medical/surgical history, family history, social history, and examination findings as documented by others; and I examined the patient myself. I personally reviewed the relevant tests, images, and reports as documented above. I formulated and edited as necessary the assessment and plan and discussed the findings and management plan with the patient and family. - Ina Moura MD

## 2024-09-04 NOTE — NURSING NOTE
Chief Complaints and History of Present Illnesses   Patient presents with    Post Op (Ophthalmology) Both Eyes     Chief Complaint(s) and History of Present Illness(es)       Post Op (Ophthalmology) Both Eyes               Comments    S/P Bilateral upper eyelid ptosis repair 7/22/2024. Pt states that the right upper lid has never sat as high as the left upper, but he feels that both of the eyelids have begun to droop again. He states he would like to get some samples of Upneeq if appropriate today.    Pt feels vision is fine, using Blink igtt each eye PRN

## 2024-09-20 ENCOUNTER — HOSPITAL ENCOUNTER (INPATIENT)
Facility: CLINIC | Age: 29
LOS: 3 days | Discharge: HOME | End: 2024-09-23
Attending: EMERGENCY MEDICINE | Admitting: INTERNAL MEDICINE
Payer: COMMERCIAL

## 2024-09-20 DIAGNOSIS — F41.1 GENERALIZED ANXIETY DISORDER: ICD-10-CM

## 2024-09-20 DIAGNOSIS — T14.91XA SUICIDE ATTEMPT (H): ICD-10-CM

## 2024-09-20 DIAGNOSIS — T39.1X2A TYLENOL OVERDOSE, INTENTIONAL SELF-HARM, INITIAL ENCOUNTER (H): Primary | ICD-10-CM

## 2024-09-20 DIAGNOSIS — F33.2 MAJOR DEPRESSIVE DISORDER, RECURRENT EPISODE, SEVERE WITH MIXED FEATURES (H): Chronic | ICD-10-CM

## 2024-09-20 DIAGNOSIS — Z72.89 DELIBERATE SELF-CUTTING: ICD-10-CM

## 2024-09-20 DIAGNOSIS — F32.1 CURRENT MODERATE EPISODE OF MAJOR DEPRESSIVE DISORDER WITHOUT PRIOR EPISODE (H): ICD-10-CM

## 2024-09-20 DIAGNOSIS — T39.1X2A TYLENOL OVERDOSE, INTENTIONAL SELF-HARM, INITIAL ENCOUNTER (H): ICD-10-CM

## 2024-09-20 PROBLEM — F12.10 MARIJUANA ABUSE: Status: ACTIVE | Noted: 2019-12-04

## 2024-09-20 PROBLEM — F12.20 CANNABIS DEPENDENCE (H): Chronic | Status: ACTIVE | Noted: 2019-12-04

## 2024-09-20 LAB
ALBUMIN SERPL BCG-MCNC: 5 G/DL (ref 3.5–5.2)
ALBUMIN SERPL BCG-MCNC: 5.1 G/DL (ref 3.5–5.2)
ALP SERPL-CCNC: 75 U/L (ref 40–150)
ALP SERPL-CCNC: 76 U/L (ref 40–150)
ALT SERPL W P-5'-P-CCNC: 36 U/L (ref 0–70)
ALT SERPL W P-5'-P-CCNC: 36 U/L (ref 0–70)
ANION GAP SERPL CALCULATED.3IONS-SCNC: 15 MMOL/L (ref 7–15)
APAP SERPL-MCNC: 77.7 UG/ML (ref 10–30)
APAP SERPL-MCNC: <5 UG/ML (ref 10–30)
AST SERPL W P-5'-P-CCNC: 35 U/L (ref 0–45)
AST SERPL W P-5'-P-CCNC: 35 U/L (ref 0–45)
ATRIAL RATE - MUSE: 89 BPM
BASOPHILS # BLD AUTO: 0 10E3/UL (ref 0–0.2)
BASOPHILS NFR BLD AUTO: 0 %
BILIRUB DIRECT SERPL-MCNC: 0.29 MG/DL (ref 0–0.3)
BILIRUB SERPL-MCNC: 1.4 MG/DL
BILIRUB SERPL-MCNC: 1.4 MG/DL
BUN SERPL-MCNC: 15 MG/DL (ref 6–20)
CALCIUM SERPL-MCNC: 9.9 MG/DL (ref 8.8–10.4)
CHLORIDE SERPL-SCNC: 96 MMOL/L (ref 98–107)
CREAT SERPL-MCNC: 1.21 MG/DL (ref 0.67–1.17)
DIASTOLIC BLOOD PRESSURE - MUSE: NORMAL MMHG
EGFRCR SERPLBLD CKD-EPI 2021: 84 ML/MIN/1.73M2
EOSINOPHIL # BLD AUTO: 0 10E3/UL (ref 0–0.7)
EOSINOPHIL NFR BLD AUTO: 0 %
ERYTHROCYTE [DISTWIDTH] IN BLOOD BY AUTOMATED COUNT: 11.9 % (ref 10–15)
GLUCOSE SERPL-MCNC: 124 MG/DL (ref 70–99)
HCO3 SERPL-SCNC: 27 MMOL/L (ref 22–29)
HCT VFR BLD AUTO: 46 % (ref 40–53)
HGB BLD-MCNC: 16.4 G/DL (ref 13.3–17.7)
HOLD SPECIMEN: NORMAL
IMM GRANULOCYTES # BLD: 0.1 10E3/UL
IMM GRANULOCYTES NFR BLD: 0 %
INR PPP: 1.35 (ref 0.85–1.15)
INR PPP: 1.38 (ref 0.85–1.15)
INTERPRETATION ECG - MUSE: NORMAL
LYMPHOCYTES # BLD AUTO: 1.2 10E3/UL (ref 0.8–5.3)
LYMPHOCYTES NFR BLD AUTO: 9 %
MCH RBC QN AUTO: 31.4 PG (ref 26.5–33)
MCHC RBC AUTO-ENTMCNC: 35.7 G/DL (ref 31.5–36.5)
MCV RBC AUTO: 88 FL (ref 78–100)
MONOCYTES # BLD AUTO: 0.7 10E3/UL (ref 0–1.3)
MONOCYTES NFR BLD AUTO: 5 %
NEUTROPHILS # BLD AUTO: 11.7 10E3/UL (ref 1.6–8.3)
NEUTROPHILS NFR BLD AUTO: 86 %
NRBC # BLD AUTO: 0 10E3/UL
NRBC BLD AUTO-RTO: 0 /100
P AXIS - MUSE: 53 DEGREES
PLATELET # BLD AUTO: 321 10E3/UL (ref 150–450)
POTASSIUM SERPL-SCNC: 4.1 MMOL/L (ref 3.4–5.3)
PR INTERVAL - MUSE: 150 MS
PROT SERPL-MCNC: 7.5 G/DL (ref 6.4–8.3)
PROT SERPL-MCNC: 7.6 G/DL (ref 6.4–8.3)
QRS DURATION - MUSE: 96 MS
QT - MUSE: 344 MS
QTC - MUSE: 418 MS
R AXIS - MUSE: 61 DEGREES
RBC # BLD AUTO: 5.22 10E6/UL (ref 4.4–5.9)
SODIUM SERPL-SCNC: 138 MMOL/L (ref 135–145)
SYSTOLIC BLOOD PRESSURE - MUSE: NORMAL MMHG
T AXIS - MUSE: 34 DEGREES
VENTRICULAR RATE- MUSE: 89 BPM
WBC # BLD AUTO: 13.7 10E3/UL (ref 4–11)

## 2024-09-20 PROCEDURE — 80143 DRUG ASSAY ACETAMINOPHEN: CPT | Performed by: EMERGENCY MEDICINE

## 2024-09-20 PROCEDURE — 96374 THER/PROPH/DIAG INJ IV PUSH: CPT

## 2024-09-20 PROCEDURE — 99207 PR APP CREDIT; MD BILLING SHARED VISIT: CPT | Performed by: STUDENT IN AN ORGANIZED HEALTH CARE EDUCATION/TRAINING PROGRAM

## 2024-09-20 PROCEDURE — 99222 1ST HOSP IP/OBS MODERATE 55: CPT | Performed by: INTERNAL MEDICINE

## 2024-09-20 PROCEDURE — 258N000003 HC RX IP 258 OP 636: Performed by: INTERNAL MEDICINE

## 2024-09-20 PROCEDURE — 85610 PROTHROMBIN TIME: CPT | Performed by: STUDENT IN AN ORGANIZED HEALTH CARE EDUCATION/TRAINING PROGRAM

## 2024-09-20 PROCEDURE — 36415 COLL VENOUS BLD VENIPUNCTURE: CPT | Performed by: EMERGENCY MEDICINE

## 2024-09-20 PROCEDURE — 99285 EMERGENCY DEPT VISIT HI MDM: CPT | Mod: 25

## 2024-09-20 PROCEDURE — 0HQKXZZ REPAIR RIGHT LOWER LEG SKIN, EXTERNAL APPROACH: ICD-10-PCS | Performed by: EMERGENCY MEDICINE

## 2024-09-20 PROCEDURE — 80143 DRUG ASSAY ACETAMINOPHEN: CPT | Performed by: INTERNAL MEDICINE

## 2024-09-20 PROCEDURE — 250N000011 HC RX IP 250 OP 636: Performed by: EMERGENCY MEDICINE

## 2024-09-20 PROCEDURE — 90471 IMMUNIZATION ADMIN: CPT | Performed by: EMERGENCY MEDICINE

## 2024-09-20 PROCEDURE — 0HQDXZZ REPAIR RIGHT LOWER ARM SKIN, EXTERNAL APPROACH: ICD-10-PCS | Performed by: EMERGENCY MEDICINE

## 2024-09-20 PROCEDURE — 120N000001 HC R&B MED SURG/OB

## 2024-09-20 PROCEDURE — 85610 PROTHROMBIN TIME: CPT | Performed by: INTERNAL MEDICINE

## 2024-09-20 PROCEDURE — 250N000013 HC RX MED GY IP 250 OP 250 PS 637: Performed by: INTERNAL MEDICINE

## 2024-09-20 PROCEDURE — 96361 HYDRATE IV INFUSION ADD-ON: CPT

## 2024-09-20 PROCEDURE — 99254 IP/OBS CNSLTJ NEW/EST MOD 60: CPT | Performed by: PSYCHIATRY & NEUROLOGY

## 2024-09-20 PROCEDURE — 36415 COLL VENOUS BLD VENIPUNCTURE: CPT | Performed by: INTERNAL MEDICINE

## 2024-09-20 PROCEDURE — 96375 TX/PRO/DX INJ NEW DRUG ADDON: CPT

## 2024-09-20 PROCEDURE — 0HQEXZZ REPAIR LEFT LOWER ARM SKIN, EXTERNAL APPROACH: ICD-10-PCS | Performed by: EMERGENCY MEDICINE

## 2024-09-20 PROCEDURE — 250N000013 HC RX MED GY IP 250 OP 250 PS 637: Performed by: STUDENT IN AN ORGANIZED HEALTH CARE EDUCATION/TRAINING PROGRAM

## 2024-09-20 PROCEDURE — 250N000011 HC RX IP 250 OP 636: Performed by: INTERNAL MEDICINE

## 2024-09-20 PROCEDURE — 0HQLXZZ REPAIR LEFT LOWER LEG SKIN, EXTERNAL APPROACH: ICD-10-PCS | Performed by: EMERGENCY MEDICINE

## 2024-09-20 PROCEDURE — 258N000003 HC RX IP 258 OP 636: Performed by: EMERGENCY MEDICINE

## 2024-09-20 PROCEDURE — 90715 TDAP VACCINE 7 YRS/> IM: CPT | Performed by: EMERGENCY MEDICINE

## 2024-09-20 PROCEDURE — 82040 ASSAY OF SERUM ALBUMIN: CPT | Performed by: EMERGENCY MEDICINE

## 2024-09-20 PROCEDURE — 85025 COMPLETE CBC W/AUTO DIFF WBC: CPT | Performed by: EMERGENCY MEDICINE

## 2024-09-20 PROCEDURE — 12005 RPR S/N/A/GEN/TRK12.6-20.0CM: CPT

## 2024-09-20 PROCEDURE — G0463 HOSPITAL OUTPT CLINIC VISIT: HCPCS

## 2024-09-20 PROCEDURE — 93005 ELECTROCARDIOGRAM TRACING: CPT

## 2024-09-20 PROCEDURE — 250N000013 HC RX MED GY IP 250 OP 250 PS 637: Performed by: EMERGENCY MEDICINE

## 2024-09-20 RX ORDER — OXYMETAZOLINE HYDROCHLORIDE OPHTHALMIC 1 MG/ML
1 SOLUTION/ DROPS OPHTHALMIC PRN
COMMUNITY

## 2024-09-20 RX ORDER — LOPERAMIDE HCL 2 MG
2 CAPSULE ORAL 4 TIMES DAILY PRN
Status: DISCONTINUED | OUTPATIENT
Start: 2024-09-20 | End: 2024-09-23 | Stop reason: HOSPADM

## 2024-09-20 RX ORDER — HYDROXYZINE HYDROCHLORIDE 25 MG/1
50 TABLET, FILM COATED ORAL EVERY 6 HOURS PRN
Status: DISCONTINUED | OUTPATIENT
Start: 2024-09-20 | End: 2024-09-23 | Stop reason: HOSPADM

## 2024-09-20 RX ORDER — ONDANSETRON 4 MG/1
4 TABLET, ORALLY DISINTEGRATING ORAL EVERY 6 HOURS PRN
Status: DISCONTINUED | OUTPATIENT
Start: 2024-09-20 | End: 2024-09-23 | Stop reason: HOSPADM

## 2024-09-20 RX ORDER — SODIUM CHLORIDE 9 MG/ML
INJECTION, SOLUTION INTRAVENOUS CONTINUOUS
Status: DISCONTINUED | OUTPATIENT
Start: 2024-09-20 | End: 2024-09-21

## 2024-09-20 RX ORDER — CALCIUM CARBONATE 500 MG/1
1000 TABLET, CHEWABLE ORAL 4 TIMES DAILY PRN
Status: DISCONTINUED | OUTPATIENT
Start: 2024-09-20 | End: 2024-09-23 | Stop reason: HOSPADM

## 2024-09-20 RX ORDER — AMOXICILLIN 250 MG
2 CAPSULE ORAL 2 TIMES DAILY PRN
Status: DISCONTINUED | OUTPATIENT
Start: 2024-09-20 | End: 2024-09-23 | Stop reason: HOSPADM

## 2024-09-20 RX ORDER — ONDANSETRON 2 MG/ML
4 INJECTION INTRAMUSCULAR; INTRAVENOUS EVERY 6 HOURS PRN
Status: DISCONTINUED | OUTPATIENT
Start: 2024-09-20 | End: 2024-09-23 | Stop reason: HOSPADM

## 2024-09-20 RX ORDER — LIDOCAINE 40 MG/G
CREAM TOPICAL
Status: DISCONTINUED | OUTPATIENT
Start: 2024-09-20 | End: 2024-09-23 | Stop reason: HOSPADM

## 2024-09-20 RX ORDER — AMOXICILLIN 250 MG
1 CAPSULE ORAL 2 TIMES DAILY PRN
Status: DISCONTINUED | OUTPATIENT
Start: 2024-09-20 | End: 2024-09-23 | Stop reason: HOSPADM

## 2024-09-20 RX ORDER — IBUPROFEN 600 MG/1
600 TABLET, FILM COATED ORAL EVERY 6 HOURS PRN
Status: DISCONTINUED | OUTPATIENT
Start: 2024-09-20 | End: 2024-09-23 | Stop reason: HOSPADM

## 2024-09-20 RX ORDER — ONDANSETRON 2 MG/ML
4 INJECTION INTRAMUSCULAR; INTRAVENOUS ONCE
Status: COMPLETED | OUTPATIENT
Start: 2024-09-20 | End: 2024-09-20

## 2024-09-20 RX ORDER — POLYETHYLENE GLYCOL 400 2.5 MG/ML
1 SOLUTION/ DROPS OPHTHALMIC 2 TIMES DAILY PRN
COMMUNITY

## 2024-09-20 RX ADMIN — ACETYLCYSTEINE 10164 MG: 200 SOLUTION ORAL; RESPIRATORY (INHALATION) at 04:19

## 2024-09-20 RX ADMIN — CLOSTRIDIUM TETANI TOXOID ANTIGEN (FORMALDEHYDE INACTIVATED), CORYNEBACTERIUM DIPHTHERIAE TOXOID ANTIGEN (FORMALDEHYDE INACTIVATED), BORDETELLA PERTUSSIS TOXOID ANTIGEN (GLUTARALDEHYDE INACTIVATED), BORDETELLA PERTUSSIS FILAMENTOUS HEMAGGLUTININ ANTIGEN (FORMALDEHYDE INACTIVATED), BORDETELLA PERTUSSIS PERTACTIN ANTIGEN, AND BORDETELLA PERTUSSIS FIMBRIAE 2/3 ANTIGEN 0.5 ML: 5; 2; 2.5; 5; 3; 5 INJECTION, SUSPENSION INTRAMUSCULAR at 03:44

## 2024-09-20 RX ADMIN — LOPERAMIDE HYDROCHLORIDE 2 MG: 2 CAPSULE ORAL at 14:49

## 2024-09-20 RX ADMIN — SODIUM CHLORIDE 1000 ML: 9 INJECTION, SOLUTION INTRAVENOUS at 03:46

## 2024-09-20 RX ADMIN — ACETYLCYSTEINE 7260 MG: 200 INJECTION, SOLUTION INTRAVENOUS at 08:22

## 2024-09-20 RX ADMIN — ONDANSETRON 4 MG: 2 INJECTION INTRAMUSCULAR; INTRAVENOUS at 03:45

## 2024-09-20 RX ADMIN — SODIUM CHLORIDE: 9 INJECTION, SOLUTION INTRAVENOUS at 06:36

## 2024-09-20 RX ADMIN — CALCIUM CARBONATE (ANTACID) CHEW TAB 500 MG 1000 MG: 500 CHEW TAB at 22:58

## 2024-09-20 ASSESSMENT — ACTIVITIES OF DAILY LIVING (ADL)
ADLS_ACUITY_SCORE: 18
ADLS_ACUITY_SCORE: 35
ADLS_ACUITY_SCORE: 18
ADLS_ACUITY_SCORE: 35
ADLS_ACUITY_SCORE: 18
ADLS_ACUITY_SCORE: 18
ADLS_ACUITY_SCORE: 35
ADLS_ACUITY_SCORE: 18
ADLS_ACUITY_SCORE: 35
ADLS_ACUITY_SCORE: 35
ADLS_ACUITY_SCORE: 18
ADLS_ACUITY_SCORE: 18
ADLS_ACUITY_SCORE: 35
ADLS_ACUITY_SCORE: 35
ADLS_ACUITY_SCORE: 18

## 2024-09-20 ASSESSMENT — COLUMBIA-SUICIDE SEVERITY RATING SCALE - C-SSRS
5. HAVE YOU STARTED TO WORK OUT OR WORKED OUT THE DETAILS OF HOW TO KILL YOURSELF? DO YOU INTEND TO CARRY OUT THIS PLAN?: YES
2. HAVE YOU ACTUALLY HAD ANY THOUGHTS OF KILLING YOURSELF IN THE PAST MONTH?: YES
1. IN THE PAST MONTH, HAVE YOU WISHED YOU WERE DEAD OR WISHED YOU COULD GO TO SLEEP AND NOT WAKE UP?: YES
6. HAVE YOU EVER DONE ANYTHING, STARTED TO DO ANYTHING, OR PREPARED TO DO ANYTHING TO END YOUR LIFE?: YES
3. HAVE YOU BEEN THINKING ABOUT HOW YOU MIGHT KILL YOURSELF?: YES

## 2024-09-20 NOTE — ED NOTES
Bed: ED22  Expected date:   Expected time:   Means of arrival:   Comments:   29M self harm, SI, calm, cooperative eta 0218

## 2024-09-20 NOTE — PLAN OF CARE
Goal Outcome Evaluation:  Pt is Aox4, up independent in the room. 1:1 sit for suicidal attempt to be discontinued at 1500 per psych. IVF running. Multiple self harm lacerations on body. Discharge pending psych recommendations.

## 2024-09-20 NOTE — H&P
"[facility]    History and Physical - Hospitalist Service       Date of Admission:  9/20/2024    Assessment & Plan      Chuckie Manzano is a 29 year old male admitted on 9/20/2024. He presents after a suicide attempt    Acetaminophen overdose  Upper and lower extremity lacerations  MDD  Reported ADD  Pt states he's been planning suicide for 1.5 years, depression longer, \"not enjoying life\". Also mentions trauma that he didn't get into. Today, first tried CO by trying plugging tailpipe (didn't work, minimal CO exposure), next cut wrists and ankles. Then took 80 pills of unclear strength tylenol. Also drank 3/4 of 750 ml vodka. Some nausea/ vomiting. In ED AFVSS. AST/ALT normal, TB 1.4. tylenol level 77.7. WBC 13.7. EKG normal  *d/w poison control  - NAC protocol  - check tylenol, LFTs, INR at 7p (~14 hours into NAC)  - suicide precautions  - 1:1 sitter   - psychiatry consult  - 72 hour hold  - safety tray  - WOC consult for wounds    Possible mild CHRISTELLE  Baseline creatinine 1.06 10/2020. Creatinine on presentation 1.2.   - IV fluids   - monitor        Diet: Combination Diet Regular Diet    DVT Prophylaxis: Ambulate every shift  Philippe Catheter: Not present  Lines: None     Cardiac Monitoring: None  Code Status:  Full by default    Clinically Significant Risk Factors Present on Admission                          # Overweight: Estimated body mass index is 25.82 kg/m  as calculated from the following:    Height as of this encounter: 1.676 m (5' 6\").    Weight as of this encounter: 72.6 kg (160 lb).              Disposition Plan     Medically Ready for Discharge: Anticipated in 2-4 Days           Ck Lomas MD  Hospitalist Service  [facility]  Securely message with [employer] (more info)  Text page via [email or web address] Paging/Directory     ______________________________________________________________________    Chief Complaint   Suicide attempt    History is obtained from the " patient, electronic health record, and emergency department physician    History of Present Illness   Chuckie Manzano is a 29 year old male who presents after suicide attempt.  He has a history of depression and ADHD.  He has never had a suicide attempt for.  He states he is feeling depressed since around 2017 after he went on a spiritual journey.  Since then he just has not enjoyed life.  He states for the last year and a half he has been contemplating implanting suicide.  Today he tried to harm himself with carbon monoxide poisoning.  He tried to put a T-shirt in the tailpipe of his car but that did not work.  [place] but he states that got scary so he got out.  He was unable to start his car after that.  It does not sound like he got any CO exposure.  He then over himself home and when he got home he cut himself.  He has 2 large lacerations on his right forearm and 1 on his left.  He also has lacerations on his left leg and right leg.  He states then he fell asleep and when he woke at about 3 in the morning he took about 80 tablets of Tylenol.  The dose is unclear.  He then decided that he wanted to get help so he called 911 ended up in the emergency department.  Here he is doing okay.  He has a flat affect.  He is a bit vague with his history.  He denies any chest pain or shortness of breath.  He has no abdominal pain.  He was having nausea vomiting but now he feels okay.      Past Medical History    Past Medical History:   Diagnosis Date    Asthma     Eczema        Past Surgical History   Past Surgical History:   Procedure Laterality Date    REPAIR PTOSIS BILATERAL Bilateral 7/22/2024    Procedure: Bilateral upper eyelid ptosis repair;  Surgeon: Ina Moura MD;  Location: [facility] OR       Prior to Admission Medications   Prior to Admission Medications   Prescriptions Last Dose Informant Patient Reported? Taking?   albuterol (PROAIR HFA/PROVENTIL HFA/VENTOLIN HFA) 108  (90 Base) MCG/ACT inhaler   Yes No   Sig: Inhale 1-2 puffs into the lungs every 6 hours as needed      Facility-Administered Medications: None        Review of Systems    The 10 point Review of Systems is negative other than noted in the HPI or here.     Social History   I have reviewed this patient's social history and updated it with pertinent information if needed.  Social History     Tobacco Use    Smoking status: Never    Smokeless tobacco: Never        Physical Exam   Vital Signs: Temp: 99.1  F (37.3  C) Temp src: Oral BP: 132/79 Pulse: 99   Resp: 12 SpO2: 99 % O2 Device: None (Room air)    Weight: 160 lbs 0 oz    General Appearance: Alert, flat affect, pleasant, no distress  Respiratory: CTA B  Cardiovascular: RRR, no murmur. No edema  GI: soft, nt/nd  Skin: has large lacerations on forearm and legs. 2 on R arm, sutured, one on L arm sutured. Also large lac posterior L leg/calf, smaller on R posterior leg/ calf  Other: CN grossly intact, DAVID. Flat affect     Medical Decision Making       70 MINUTES SPENT BY ME on the date of service doing chart review, history, exam, documentation & further activities per the note.      Data   ------------------------- PAST 24 HR DATA REVIEWED -----------------------------------------------    I have personally reviewed the following data over the past 24 hrs:    13.7 (H)  \   16.4   / 321     138 96 (L) 15.0 /  124 (H)   4.1 27 1.21 (H) \     ALT: 36 AST: 35 AP: 76 TBILI: 1.4 (H)   ALB: 5.0 TOT PROTEIN: 7.6 LIPASE: N/A       Imaging results reviewed over the past 24 hrs:   No results found for this or any previous visit (from the past 24 hour(s)).

## 2024-09-20 NOTE — DISCHARGE INSTRUCTIONS
A therapy appointment with the [facility] has been scheduled for you:     Date: 9/25/2024   Time: 8:30 AM Length: 60  Visit Type: ADULT PSYCHOTHERAPY NEW   Provider: Keila Perla LADC      Department Address: [address] [place] [address] [address]  [place] [address]  Department Phone: [phone]      MENTAL HEALTH RESOURCES & SERVICES:   Behavioral Healthcare Providers Scheduling  During your hospitalization, you were seen by a licensed mental health professional through [facility] Providers (P)  for a brief mental health assessment and/or psychotherapy at [facility].  It is recommended that you follow up with your established providers (psychiatrist, mental health therapist, and/or primary care doctor - as relevant) as soon as possible. Coordinators from [facility] will be calling you in the next 24-48 hours to ensure that you have the resources you need.  You can also contact [facility] coordinators directly at [phone].    You have been scheduled for the following mental health appointments:     Date: Friday, October 11th Time: 8:00 AM  Provider: Jonhson Sams  Location: [address] [place] [address], [address]  Phone: [phone]   Type: Intake Assessment (PHP)       [facility] maintains an extensive network of licensed behavioral health providers to connect patients with the services they need.  We do not charge providers a fee to participate in our referral network.  We match patients with providers based on a patient s specific needs, insurance coverage, and location.  Our first effort will be to refer you to a provider within your care system, and will utilize providers outside your care system as needed.            WOUND CARES  Location: wrists, ankles   Care: daily   Remove bandages, cleanse with soap and water or gauze with saline, pat dry   Ok to apply Sween 24 / dimethicone lotion  to shallow skin damage   Cover stitched wounds with non-adherent gauze / oil emulsion gauze / adaptic guaze   Add gauze, as needed, to absorb  Wrap with roll gauze or gently with coban   Suture removal = DUE 7-10 days after placement (around 9/27)     EXTRA SUGGESTION:  Look into DBT dialectical behavioral therapy/groups, you may find it helpful

## 2024-09-20 NOTE — ED NOTES
Close patient observation continued, Camera on and doors locked. Mother in the room with patient. Patient and mother educated on suicidal safety measures in place, with presence of camera monitoring

## 2024-09-20 NOTE — PROGRESS NOTES
"[facility]    Medicine Progress Note - Hospitalist Service    Date of Admission:  9/20/2024    Assessment & Plan   Chuckie Manzano is a 29 year old male admitted on 9/20/2024. He presents after a suicide attempt.     Acetaminophen overdose  Upper and lower extremity lacerations  MDD  Reported ADD  Pt states he's been planning suicide for 1.5 years, depression longer, \"not enjoying life\". Also mentions trauma that he didn't get into. Today, first tried CO by trying plugging tailpipe (didn't work, minimal CO exposure), next cut wrists and ankles. Then took 80 pills of unclear strength tylenol. Also drank 3/4 of 750 ml vodka. Some nausea/ vomiting. In ED AFVSS. AST/ALT normal, TB 1.4. tylenol level 77.7. WBC 13.7. EKG normal  *d/w poison control  - NAC protocol, continue until cleared by poison center to stop  - check tylenol, LFTs, INR at 1900H 9/20 (~14 hours into NAC)  - suicide precautions  - 1:1 sitter, and 72 hour hold discontinued 9/20 by psychiatry  - patient is voluntary, planning to discharge to inpatient psych when medically cleared.  - psychiatry consult  - safety tray  - WO consult for wounds  - currently ambulatory for VTE ppx. If develops worsening hepatic disease from tylenol consider introduction of chemical VTE ppx.      Possible mild CHRISTELLE  Baseline creatinine 1.06 10/2020. Creatinine on presentation 1.2.   - IV fluids   - monitor    Loose stools  - imodium  - monitor          Diet: Combination Diet Regular Diet; Safe Tray - with utensils    DVT Prophylaxis: Low Risk/Ambulatory with no VTE prophylaxis indicated  Philippe Catheter: Not present  Lines: None     Cardiac Monitoring: None  Code Status: Full Code      Clinically Significant Risk Factors Present on Admission               # Coagulation Defect: INR = 1.35 (Ref range: 0.85 - 1.15) and/or PTT = N/A, will monitor for bleeding            # Overweight: Estimated body mass index is 25.82 kg/m  as calculated from the " "following:    Height as of this encounter: 1.676 m (5' 6\").    Weight as of this encounter: 72.6 kg (160 lb).       # Financial/Environmental Concerns: none         Disposition Plan     Medically Ready for Discharge: Anticipated in 2-4 Days  Needs to complete NAC protocol. Then will discharge to inpatient psych when medically cleared.            Gunner Nails MD  Hospitalist Service  [facility]  Securely message with [employer] (more info)  Text page via [employer] Paging/Directory   ______________________________________________________________________    Interval History   Admitted overnight. Seen by psych this AM. Stopped 1:1 and hold. Contracts for safety  Mother at bedside when seen.   Discussed plan for NAC until labs improve, cleared by poison control. Then discharge to inpatient psych. They are in agreement.   Discussed with RN and RN sit.   Ordered immodium for loose stools.     Physical Exam   Vital Signs: Temp: 98.2  F (36.8  C) Temp src: Oral BP: 137/68 Pulse: 91   Resp: 12 SpO2: 98 % O2 Device: None (Room air)    Weight: 160 lbs 0 oz    Constitutional: Awake, alert, cooperative, no apparent distress  Respiratory: Clear to auscultation bilaterally, no crackles or wheezing  Cardiovascular: Regular rate and rhythm, normal S1 and S2, and no murmur noted  GI: Normal bowel sounds, soft, non-distended, non-tender  Skin/Integumen: No rashes, no cyanosis, no edema on exposed skin. Ankles, wrists bandaged.   Other:       Medical Decision Making       38 MINUTES SPENT BY ME on the date of service doing chart review, history, exam, documentation & further activities per the note.      Data     ------------------------- PAST 24 HR DATA REVIEWED -----------------------------------------------    I have personally reviewed the following data over the past 24 hrs:    13.7 (H)  \   16.4   / 321     138 96 (L) 15.0 /  124 (H)   4.1 27 1.21 (H) \     ALT: 36; 36 AST: 35; 35 AP: 76; 75 TBILI: 1.4 (H); " 1.4 (H)   ALB: 5.0; 5.1 TOT PROTEIN: 7.6; 7.5 LIPASE: N/A     INR:  1.35 (H) PTT:  N/A   D-dimer:  N/A Fibrinogen:  N/A       Imaging results reviewed over the past 24 hrs:   No results found for this or any previous visit (from the past 24 hour(s)).
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
## 2024-09-20 NOTE — CONSULTS
"[facility]  WO Nurse Inpatient Assessment     Consulted for: Wound arms and legs, lacerations (sutured) from suicide attempt     Summary: patient with POA skin injury to bilateral wrists and ankles, initial [facility] exam 9/20    Patient History (according to provider note(s):      \"29 year old male admitted on 9/20/2024. He presents after a suicide attempt \"    Assessment:      Areas visualized during today's visit: Focused:, Lower extremities , and Upper extremities     Wound location: wrists and ankles    Right posterior lower leg    Left wrist     Left posterior leg     Last photo: 9/20  Wound due to: Trauma self injurious behavior cutting   Wound history/plan of care: found sutured and covered with telfa pas and coban   Wound base: dermis to more shallow areas, well approximated sutured to others (subcutaneous tissue, well approximated)      Palpation of the wound bed: normal      Drainage: small     Description of drainage: serosanguinous     Measurements (length x width x depth, in cm): right wrist with 2 sutured areas, other body parts with only single area sutured, 5cm  x 0.3  x  0.1 cm      Tunneling: N/A     Undermining: N/A  Periwound skin: Intact      Color: normal and consistent with surrounding tissue      Temperature: normal   Odor: none  Pain: mild and moderate, tender  Pain interventions prior to dressing change: soaking, slow and gentle cares , and distraction  Treatment goal: Heal   STATUS: initial assessment  Supplies ordered: supplies stored on unit and discussed with patient        Treatment Plan:       Wound care  Start:  09/21/24 0600,   DAILY,   Routine        Comments: Location: wrists, ankles  Care: daily by primary RN  1. Remove bandages, cleanse with gauze with saline, pat dry  2. Ok to apply Sween 24 / dimethicone lotion to shallow skin damage  3. Cover stitched wounds with non-adherent gauze / oil emulsion gauze / adaptic guaze  4. Add mepilex lite or ABD pad or " gauze, as needed, to absorb  5. Wrap with roll gauze, secure with tape          Orders: Written    RECOMMEND PRIMARY TEAM ORDER: None, at this time  Education provided: plan of care  Discussed plan of care with: Patient, Family, and Nurse  [facility] nurse follow-up plan: weekly  Notify WO if wound(s) deteriorate.  Nursing to notify the Provider(s) and re-consult the [facility] Nurse if new skin concern.    DATA:     Current support surface: Standard  Standard gel mattress (Isoflex)  Containment of urine/stool: Continent of bladder and Continent of bowel  BMI: Body mass index is 25.82 kg/m .   Active diet order: Orders Placed This Encounter      Combination Diet Regular Diet; Safe Tray - with utensils     Output: No intake/output data recorded.     Labs:   Recent Labs   Lab 09/20/24  0308   ALBUMIN 5.1  5.0   HGB 16.4   INR 1.35*   WBC 13.7*     Pressure injury risk assessment:   Sensory Perception: 4-->no impairment  Moisture: 4-->rarely moist  Activity: 4-->walks frequently  Mobility: 4-->no limitation  Nutrition: 3-->adequate  Friction and Shear: 3-->no apparent problem  Logan Score: 22    Jennifer CWOCN   1st choice: Securely message with [employer] ([facility])   (2nd option: [facility] Office Phone [phone], messages checked periodically Mon-Fri 8a-4p)

## 2024-09-20 NOTE — PLAN OF CARE
Goal Outcome Evaluation:                 9/20/24 [id number]    Orientation: A/Ox4. Calm. Flat.   Aggression Stop Light: green  Activity: Ind in room. Hallway x1 with NA.   Diet/BS Checks: Regular, safe tray.  Tele:  N/A  IV Access/Drains:1 PIV infusing Acetadote @ 64.8 mL/hr, pausing NS IVF since it is not compatible.   Pain Management: Denies pain and nausea. Just a bit discomfort stomach.   Abnormal VS/Results: VSS on RA  Bowel/Bladder: Continent B/B. Good voiding.   Skin/Wounds: Wounds on wrists and ankles from self harm.   Consults: WOC, psych  D/C Disposition: pending.   Other Info:     -Removed sitter this shift  - Acetaminophen = 77.7   -INR= 1.38  -Denied thoughts of suicide.   -Took shower this shift.

## 2024-09-20 NOTE — ED NOTES
Writer called [facility] 24/7 dispatch non-emergency: [phone] and spoke with dispatch and informed them of writer wanting to make an ERPO report and left call back information. Writer was informed a deputy would call back for the report.         Writer received a phone call back from [phone]. Deputy Cha endorsed that writer did not have to make a verbal report but needed to file an online report/ form and send in that paperwork for the courts to review.       Inocencia Gill [facility] on 9/20/2024 at 3:23 PM

## 2024-09-20 NOTE — CONSULTS
"    [facility]     INITIAL PSYCHIATRY   CONSULT     DATE OF SERVICE   9/20/2024       IDENTIFICATION   Chuckie Manzano  Age: 29 year old  MRN# [id number]   YOB: 1995        CHIEF COMPLAINT   \"I tried to take my life.\"       CONSULT REQUEST BY   Ck Lomas re:  72 hour holds placed on 9/20/2024 at 0742 and 0936       HISTORY OF PRESENT ILLNESS   This is a 29 year old male with history of depression and cannabis use.  Past mental history does not include previous psychiatric hospitalization, suicide attempt, CD treatment, nor significant medication management.  Now, admitted secondary to intentional failed suicide attempts x 3 prior to admission.    Consult placed to psychiatry regarding suicide attempt and 72-hour holds.    In brief, patient presented to this facility's emergency department on 9/20.  Secondary to a series of 3 suicide attempts, initially planned 1-1/2 years ago due to depression and issues with, \"quality of life.\"  Initial plan was by carbon monoxide poisoning by plugging medical tail pipe.  In addition, cut wrists and ankles bilaterally to, \"bleed out.\" Due to failed attempts at suicide, ingested 80 pills of 250 mg Tylenol with vodka.  Reportedly, planning suicide for 18 months due to decrease in quality of life.  Alcohol use is typically only once per month.  Due to acetaminophen overdose, lacerations, CHRISTELLE, started on IV fluids and admitted medically.  Placed on a 72-hour hold.  Consult placed to psychiatry.    Upon interview, the patient is cooperative on approach.  Visit with performed in patient's room on the unit.  Consent is given to evaluate individually, without mother present.  Patient is not voluntary.  Patient is made aware of plan to coordinate cares with treatment team.    Prior to evaluation, discussed patient with DEC .  Documentation not available for review.  According to her , patient agreed to psychiatric admission " "voluntarily.  However, patient placed on a 72-hour hold and admitted medically prior to placement.    Patient evaluation to review events leading to admission and to clarify admission as provided in collateral report.    Patient confirms mental health history is limited.  Denying prior psychiatric hospitalization, mental contact with therapist or psychiatrist, suicide attempt, nor CD treatment.  Denies problematic substance use as reported; drinking once per month.  However, does admit to issues related to cannabis contributing to presentation.  Does have prior medication trial with sertraline, limited at only 2 weeks in 2018.  Stopped trial due to GI side effects.    Patient describes signs and symptoms consistent with depression with mixed symptoms as anxiety.  Recurrent episode with onset 1-1/2 years ago.  Initially, symptoms started in approximately 2017.  Most recent episode in May of this year.  Describes mood as, \"sad.\"  Associated with feelings of anhedonia.,  \"Not enjoying\" with negative thoughts of self and situation.  Generalized anxiety.  Issues of motivation and procrastinating.  Started suicide attempt planning 18 months ago due to issues, \" quality of life.\"  Patient is vague with related issues.  As part of plan, reports in Fall 2023 intentional, \"financial suicide.\"  Describes episode of ruminating good credit score by intentionally spending on credit cards with knowledge of planned suicide.  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    On further psychiatric review of symptoms, endorses manic symptoms.  Denies further symptoms of shane.  Intact sleep, energy.  Denies issues of appetite.  Motivated by exercise.  Denies psychosis.  Does express regret and remorse of suicide behavior.  Future-oriented statements.  Denies gun access.  Now, able to state an appropriate crisis relapse plan.  Able to contract for safety while hospitalized.  Future-oriented " "statements related to family and getting, \"better.\"  Consents to inpatient psychiatric hospitalization voluntarily, however, apprehensive to start medications due to past trial associated side effect and establish benefit of exercise to manage mental health.    Patient denies problematic alcohol use.  Typically, drinking once per month.  Substance of choice is cannabis.  Describes, \"quality of life,\" worsening associated with cessation of cannabis and feels stopping use is a, \"reaction.\"  For a 6-month period, describes increased anxiety and withdrawn behavior from friends during a time frame starting March 2023.  Denies opioid use.  Otherwise, denies history of CD treatment, detox, legal issues.    Treatment plan discussed in detail.  Admitted is on 2 separate 72-hour holds placed on 9/20 at 0742 and 0936.  Confirms monitor admission as discussed with DEC evaluator prior to medical admission.  Accepts recommendation for inpatient psychiatric placement, once medically cleared.  Unable to place onto inpatient list until cleared medically.  Patient made aware of being holdable.  Medication management discussed in detail and declines until able to discuss once admitted.      Review of external notes and/or information:  I personally reviewed notes from the patient's admission note dated 9/20. This provided me with information regarding patient's recent clinical course.     I personally reviewed the patient's chart, including available medication list and available past medical history, past surgical history, family history, and social history.        CHEMICAL DEPENDENCY HISTORY   History   Drug Use Not on file     Social History    Substance and Sexual Activity      Alcohol use: Not on file    History   Smoking Status    Never   Smokeless Tobacco    Never     Treatment: Denies  Detox: Denies  Legal: Denies       PAST PSYCHIATRIC HISTORY   Psychiatrist: None  Therapist: None  Hospitalizations: Denies  Past Medications: "   Zoloft trial x 2 weeks.  TMS/ECT/Ketamine:  No  Suicide Attempts/Gun Access: As per HPI.  No previous suicide attempts.  Denies gun access.       PAST MEDICAL HISTORY   Past Medical History:   Diagnosis Date    Asthma     Eczema      Past Surgical History:   Procedure Laterality Date    REPAIR PTOSIS BILATERAL Bilateral 7/22/2024    Procedure: Bilateral upper eyelid ptosis repair;  Surgeon: Ina Moura MD;  Location:  OR     Primary Care Provider: Salome Duke  Medications:   Current Facility-Administered Medications   Medication Dose Route Frequency Provider Last Rate Last Admin    sodium chloride (PF) 0.9% PF flush 3 mL  3 mL Intracatheter Q8H Ck Lomas MD         Medications as needed:   Current Facility-Administered Medications   Medication Dose Route Frequency Provider Last Rate Last Admin    calcium carbonate (TUMS) chewable tablet 1,000 mg  1,000 mg Oral 4x Daily PRN Ck Lomas MD        ibuprofen (ADVIL/MOTRIN) tablet 600 mg  600 mg Oral Q6H PRN Ck Lomas MD        lidocaine (LMX4) cream   Topical Q1H PRN Ck Lomas MD        lidocaine 1 % 0.1-1 mL  0.1-1 mL Other Q1H PRN Ck Lomas MD        ondansetron (ZOFRAN ODT) ODT tab 4 mg  4 mg Oral Q6H PRN Ck Lomas MD        Or    ondansetron (ZOFRAN) injection 4 mg  4 mg Intravenous Q6H PRN Ck Lomas MD        senna-docusate (SENOKOT-S/PERICOLACE) 8.6-50 MG per tablet 1 tablet  1 tablet Oral BID PRN Ck Lomas MD        Or    senna-docusate (SENOKOT-S/PERICOLACE) 8.6-50 MG per tablet 2 tablet  2 tablet Oral BID PRN Ck Lomas MD        sodium chloride (PF) 0.9% PF flush 3 mL  3 mL Intracatheter q1 min prn Ck Lomas MD         ALLERGIES: Peanut (diagnostic), Sulfa antibiotics, Doxycycline, Menactra [mening acy&w-135 diphth conj], and Trifluoperazine    Reviewed in detail and see ED and Intake for full details  and history.       MEDICATIONS   Medications Prior to Admission   Medication Sig Dispense Refill Last Dose    albuterol (PROAIR HFA/PROVENTIL HFA/VENTOLIN HFA) 108 (90 Base) MCG/ACT inhaler Inhale 1-2 puffs into the lungs every 6 hours as needed    at prn    Oxymetazoline HCl (UPNEEQ) 0.1 % SOLN Apply 1 drop to eye as needed. Pt to use own.  Has 1 sample left from Ophthalmologist.    at prn    polyethylene glycol 400 (BLINK TEARS) 0.25 % SOLN ophthalmic solution Place 1 drop into both eyes 2 times daily as needed for dry eyes.    at prn         ROS   The 10 point Review of Systems is negative other than noted in the HPI or here.       FAMILY HISTORY   Family History   Problem Relation Age of Onset    Glaucoma No family hx of     Macular Degeneration No family hx of       Psychiatric: Depression  Chemical: None reported  Suicide: Denies       SOCIAL HISTORY   Social History     Socioeconomic History    Marital status: Single     Spouse name: Not on file    Number of children: Not on file    Years of education: Not on file    Highest education level: Not on file   Occupational History    Not on file   Tobacco Use    Smoking status: Never    Smokeless tobacco: Never   Substance and Sexual Activity    Alcohol use: Not on file    Drug use: Not on file    Sexual activity: Not on file   Other Topics Concern    Not on file   Social History Narrative    Not on file     Social Determinants of Health     Financial Resource Strain: Low Risk  (11/4/2023)    [facility], [facility]    Financial Resource Strain     Difficulty of Paying Living Expenses: 3     Difficulty of Paying Living Expenses: Not on file   Food Insecurity: No Food Insecurity (11/4/2023)    [facility], [facility]    Food Insecurity     Worried About Running Out of Food in the Last Year: 1    Transportation Needs: No Transportation Needs (11/4/2023)    [facility]    Transportation Needs     Lack of Transportation (Medical): 1   Physical Activity: Not on file   Stress: Not on file   Social Connections: Socially Integrated (11/4/2023)    [facility]    Social Connections     Frequency of Communication with Friends and Family: 0   Interpersonal Safety: Low Risk  (9/20/2024)    Interpersonal Safety     Do you feel physically and emotionally safe where you currently live?: Yes     Within the past 12 months, have you been hit, slapped, kicked or otherwise physically hurt by someone?: No     Within the past 12 months, have you been humiliated or emotionally abused in other ways by your partner or ex-partner?: No   Housing Stability: Low Risk  (11/4/2023)    [facility]    Housing Stability     Unable to Pay for Housing in the Last Year: 1     Occupation: Sales.  Quit work 2 weeks ago.  Marital Status: Single  Children: 0  Living Situation: Living arrangements - the patient lives alone       MENTAL STATUS EXAM   Appearance: Cooperative.  Decreased eye contact.  Mood:  {Mood: Sad   Affect: blunted  was congruent to speech  Suicidal Ideation: PRESENT / ABSENT: present intentional suicidal behavior x 3 prior to admission.  Denies current thoughts of suicide while hospitalized.  Homicidal Ideation: PRESENT / ABSENT: absent   Thought process: perseverative and no LILIBETH.  Thought content: significant for suicidal ideation x 18 months leading to attempt of suicide as per HPI.  Fund of Knowledge: Average  Attention/Concentration: Easily distracted  Language ability:  Intact  Memory: Sufficient  Insight:  fair.  Judgement: fair  Orientation: Yes,  "x4  Psychomotor Behavior: slowed    Muscle Strength and Tone: MuscleStrength: Normal  Gait and Station:  In bed       PHYSICAL EXAM   Vitals: /68   Pulse 91   Temp 98.2  F (36.8  C) (Oral)   Resp 12   Ht 1.676 m (5' 6\")   Wt 72.6 kg (160 lb)   SpO2 98%   BMI 25.82 kg/m    Weight:   160 lbs 0 oz    Body mass index is 25.82 kg/m .    Physical exam as per Hospitalist, Dr. Ck Lomas MD. Dated 9/20/2024:    General Appearance:  Alert, flat affect, pleasant, no distress  Respiratory: CTA B  Cardiovascular: RRR, no murmur. No edema  GI: soft, nt/nd  Skin: has large lacerations on forearm and legs. 2 on R arm, sutured, one on L arm sutured. Also large lac posterior L leg/calf, smaller on R posterior leg/ calf  Other:  CN grossly intact, DAVID. Flat affect      I have reviewed the physical exam as documented by by the medical team and agree with findings and assessment and have no additional findings to add at this time.       LABS   personally reviewed.   Recent Results (from the past 48 hour(s))   EKG 12 lead    Collection Time: 09/20/24  3:01 AM   Result Value Ref Range    Systolic Blood Pressure  mmHg    Diastolic Blood Pressure  mmHg    Ventricular Rate 89 BPM    Atrial Rate 89 BPM    NV Interval 150 ms    QRS Duration 96 ms     ms    QTc 418 ms    P Axis 53 degrees    R AXIS 61 degrees    T Axis 34 degrees    Interpretation ECG       Sinus rhythm  Normal ECG  No previous ECGs available  Confirmed by GENERATED REPORT, COMPUTER ([id number]),  Kerrie Awad ([id number]) on 9/20/2024 9:38:15 AM     Comprehensive metabolic panel    Collection Time: 09/20/24  3:08 AM   Result Value Ref Range    Sodium 138 135 - 145 mmol/L    Potassium 4.1 3.4 - 5.3 mmol/L    Carbon Dioxide (CO2) 27 22 - 29 mmol/L    Anion Gap 15 7 - 15 mmol/L    Urea Nitrogen 15.0 6.0 - 20.0 mg/dL    Creatinine 1.21 (H) 0.67 - 1.17 mg/dL    GFR Estimate 84 >60 mL/min/1.73m2    Calcium 9.9 8.8 - 10.4 mg/dL    Chloride 96 (L) 98 - 107 " "mmol/L    Glucose 124 (H) 70 - 99 mg/dL    Alkaline Phosphatase 76 40 - 150 U/L    AST 35 0 - 45 U/L    ALT 36 0 - 70 U/L    Protein Total 7.6 6.4 - 8.3 g/dL    Albumin 5.0 3.5 - 5.2 g/dL    Bilirubin Total 1.4 (H) <=1.2 mg/dL   Acetaminophen level    Collection Time: 09/20/24  3:08 AM   Result Value Ref Range    Acetaminophen 77.7 (H) 10.0 - 30.0 ug/mL   CBC with platelets and differential    Collection Time: 09/20/24  3:08 AM   Result Value Ref Range    WBC Count 13.7 (H) 4.0 - 11.0 10e3/uL    RBC Count 5.22 4.40 - 5.90 10e6/uL    Hemoglobin 16.4 13.3 - 17.7 g/dL    Hematocrit 46.0 40.0 - 53.0 %    MCV 88 78 - 100 fL    MCH 31.4 26.5 - 33.0 pg    MCHC 35.7 31.5 - 36.5 g/dL    RDW 11.9 10.0 - 15.0 %    Platelet Count 321 150 - 450 10e3/uL    % Neutrophils 86 %    % Lymphocytes 9 %    % Monocytes 5 %    % Eosinophils 0 %    % Basophils 0 %    % Immature Granulocytes 0 %    NRBCs per 100 WBC 0 <1 /100    Absolute Neutrophils 11.7 (H) 1.6 - 8.3 10e3/uL    Absolute Lymphocytes 1.2 0.8 - 5.3 10e3/uL    Absolute Monocytes 0.7 0.0 - 1.3 10e3/uL    Absolute Eosinophils 0.0 0.0 - 0.7 10e3/uL    Absolute Basophils 0.0 0.0 - 0.2 10e3/uL    Absolute Immature Granulocytes 0.1 <=0.4 10e3/uL    Absolute NRBCs 0.0 10e3/uL   Extra Blue Top Tube    Collection Time: 09/20/24  3:08 AM   Result Value Ref Range    Hold Specimen JIC    INR    Collection Time: 09/20/24  3:08 AM   Result Value Ref Range    INR 1.35 (H) 0.85 - 1.15     No results found for: \"PHENYTOIN\", \"PHENOBARB\", \"VALPROATE\", \"CBMZ\"       ASSESSMENT   Consult placed to psychiatry regarding worsening of depression leading to planned suicide attempt.  Plan initiated 18 months ago.  In setting of recurrent episode of depression.  After initial suicide attempt by carbon monoxide poisoning and cutting behavior failed, patient attempted overdose with alcohol.  Patient consents to voluntary admission for inpatient psychiatric hospitalization as discussed in detail with " education provided in detail related to 72-hour hold placed prior to admission.  Patient remains holdable until care is coordinated for safe psychiatric admission.       DIAGNOSIS   Principal Problem:    Major depressive disorder, recurrent episode, severe with mixed features    Active Problem List:  Patient Active Problem List   Diagnosis    Current moderate episode of major depressive disorder without prior episode (H)    Generalized anxiety disorder    Cannabis dependence (H)    Congenital ptosis of left eyelid    Congenital ptosis, right    Suicide attempt (H)    Deliberate self-cutting    Tylenol overdose, intentional self-harm, initial encounter (H)    Major depressive disorder, recurrent episode, severe with mixed features (H)          RECOMMENDATIONS   Target psychiatric symptoms and interventions:  Education:  Risks, benefits, and alternatives discussed at length with patient.  Including, detailed discussion related to 72-hour hold and associated legal process.    Safety/Supervision/Disposition:  Legal Status: 72 hour holds --> Discontinue 72 hour holds, consents to voluntary mental health admission.  Holdable, if demands discharge prior to safe placement.  Precautions placed:   Discontinue 1:1 for safety.  Contracts for safety while hospitalized.  Plan discussed with direct care RN/Sitter.  Discussed alternative plan to continue 1:1 for medical purposes.  Placement:  Inpatient Psychiatry, once medically cleared.    Medication Recommendations:   PTA Psychiatric Medications reviewed:  NONE  Discussed, reviewed psychiatric medication management for depression, anxiety, mood.  Declines medication started at this time, agrees to consider when admitted.  Hydroxyzine: PRN hydroxyzine 50 mg every 6 hours, anxiety/sleep.    Acute Medical Problems and Treatments:  History and Physical (9/20):  Reviewed and discussed with patient.  Acetaminophen 77.7  WBC 13.7  Cr 1.21   Qtc 418    Care Coordination:  Treatment plan  discussed with directly with RN/sitter.  Please reconsult with Psychiatry as needed.   Psychiatry Consult service not available over the weekend.  Please contact  Admissions, if patient is medically stable to proceed with psychiatric placement during the weekend.        Risk Assessment: IP MHAC RISK ASSESSMENT: Patient able to contract for safety    Total encounter time:  A total of  72  minutes spent related to chart review, history and exam, documentation   and further activities as noted above    This note was created with help of Dragon dictation system. Grammatical / typing errors are not intentional.        Crispin Manrique MD - 09/20/2024  - 10:43 AM  Consult/Liaison Psychiatry   [facility]

## 2024-09-20 NOTE — PROGRESS NOTES
RECEIVING UNIT ED HANDOFF REVIEW    ED Nurse Handoff Report was reviewed by: Amyaa Fleming RN on September 20, 2024 at 8:58 AM

## 2024-09-20 NOTE — ED NOTES
Called Poison Control, provided AST,ALT and Acetaminophen levels. Recommended per oral Mucomyst every 4 hours for 24 hours, or IV protocol if patient not tolerating oral

## 2024-09-20 NOTE — ED TRIAGE NOTES
BIBA, accompanied by mother - Patient had a suicidal attempt by cutting his bilateral wrist and bilateral ankle in attempt to commit suicide. This failed, then reports to have ingested 80 tabs of 250 mg Tylenol between 3-5 pm yesterday, followed by drinking 3/4 of 750 ml Vodka. He then took an uber to [facility] after these attempts failed. While there, he saw a house with TV on, he went there and requested for help. The owners of the home called 911 .  Patient denies to have suicidal ideation at this time

## 2024-09-20 NOTE — PHARMACY-ADMISSION MEDICATION HISTORY
Pharmacist Admission Medication History    Admission medication history is complete. The information provided in this note is only as accurate as the sources available at the time of the update.    Information Source(s): Patient via in-person    Pertinent Information: none    Changes made to PTA medication list:  Added: Upneeq, Blink  Deleted: None  Changed: None    Allergies reviewed with patient and updates made in EHR: no    Medication History Completed By: Tamera Carrillo [facility] 9/20/2024 8:08 AM    PTA Med List   Medication Sig Last Dose    albuterol (PROAIR HFA/PROVENTIL HFA/VENTOLIN HFA) 108 (90 Base) MCG/ACT inhaler Inhale 1-2 puffs into the lungs every 6 hours as needed  at prn    Oxymetazoline HCl (UPNEEQ) 0.1 % SOLN Apply 1 drop to eye as needed. Pt to use own.  Has 1 sample left from Ophthalmologist.  at prn    polyethylene glycol 400 (BLINK TEARS) 0.25 % SOLN ophthalmic solution Place 1 drop into both eyes 2 times daily as needed for dry eyes.  at prn

## 2024-09-20 NOTE — ED NOTES
[facility]  ED Nurse Handoff Report    ED Chief complaint: Suicidal and Ingestion      ED Diagnosis:   Final diagnoses:   Suicide attempt (H)   Tylenol overdose, intentional self-harm, initial encounter (H)   Deliberate self-cutting       Code Status: Full Code    Allergies:   Allergies   Allergen Reactions    Peanut (Diagnostic) Dermatitis     Causes burning of skin - immediate sores in mouth and on lips    Sulfa Antibiotics Dermatitis     Sulfa caused severe flares of eczema as an infant    Doxycycline Nausea and Vomiting    Menactra [Mening Acy&W-135 Diphth Conj] Rash    Trifluoperazine Swelling and Rash       Patient Story: BIBA, accompanied by mother - Patient had a suicidal attempt by cutting his bilateral wrist and bilateral ankle in attempt to commit suicide. This failed, then reports to have ingested 80 tabs of 250 mg Tylenol between 3-5 pm yesterday, followed by drinking 3/4 of 750 ml Vodka. He then took an uber to [facility] after these attempts failed. While there, he saw a house with TV on, he went there and requested for help. The owners of the home called 911 .  Patient denies to have suicidal ideation at this time  Focused Assessment:  Alert and oriented. VSS, on room air. C/o nausea, IV Zofran given. Denies pain.  Mother by the bedside. Bilateral ankles and bilateral wrist wounds sutured by the provider.     Addendum 0749: Pt placed on 72 hour hold by ER MD.     Treatments and/or interventions provided:   Results for orders placed or performed during the hospital encounter of 09/20/24   Comprehensive metabolic panel     Status: Abnormal   Result Value Ref Range    Sodium 138 135 - 145 mmol/L    Potassium 4.1 3.4 - 5.3 mmol/L    Carbon Dioxide (CO2) 27 22 - 29 mmol/L    Anion Gap 15 7 - 15 mmol/L    Urea Nitrogen 15.0 6.0 - 20.0 mg/dL    Creatinine 1.21 (H) 0.67 - 1.17 mg/dL    GFR Estimate 84 >60 mL/min/1.73m2    Calcium 9.9 8.8 - 10.4 mg/dL    Chloride 96 (L) 98 - 107 mmol/L     Glucose 124 (H) 70 - 99 mg/dL    Alkaline Phosphatase 76 40 - 150 U/L    AST 35 0 - 45 U/L    ALT 36 0 - 70 U/L    Protein Total 7.6 6.4 - 8.3 g/dL    Albumin 5.0 3.5 - 5.2 g/dL    Bilirubin Total 1.4 (H) <=1.2 mg/dL   Acetaminophen level     Status: Abnormal   Result Value Ref Range    Acetaminophen 77.7 (H) 10.0 - 30.0 ug/mL   Blunt Draw     Status: None    Narrative    The following orders were created for panel order Blunt Draw.  Procedure                               Abnormality         Status                     ---------                               -----------         ------                     Extra Blue Top Tube[[id number]]                              Final result                 Please view results for these tests on the individual orders.   CBC with platelets and differential     Status: Abnormal   Result Value Ref Range    WBC Count 13.7 (H) 4.0 - 11.0 10e3/uL    RBC Count 5.22 4.40 - 5.90 10e6/uL    Hemoglobin 16.4 13.3 - 17.7 g/dL    Hematocrit 46.0 40.0 - 53.0 %    MCV 88 78 - 100 fL    MCH 31.4 26.5 - 33.0 pg    MCHC 35.7 31.5 - 36.5 g/dL    RDW 11.9 10.0 - 15.0 %    Platelet Count 321 150 - 450 10e3/uL    % Neutrophils 86 %    % Lymphocytes 9 %    % Monocytes 5 %    % Eosinophils 0 %    % Basophils 0 %    % Immature Granulocytes 0 %    NRBCs per 100 WBC 0 <1 /100    Absolute Neutrophils 11.7 (H) 1.6 - 8.3 10e3/uL    Absolute Lymphocytes 1.2 0.8 - 5.3 10e3/uL    Absolute Monocytes 0.7 0.0 - 1.3 10e3/uL    Absolute Eosinophils 0.0 0.0 - 0.7 10e3/uL    Absolute Basophils 0.0 0.0 - 0.2 10e3/uL    Absolute Immature Granulocytes 0.1 <=0.4 10e3/uL    Absolute NRBCs 0.0 10e3/uL   Extra Blue Top Tube     Status: None   Result Value Ref Range    Hold Specimen [facility]    EKG 12 lead     Status: None (Preliminary result)   Result Value Ref Range    Systolic Blood Pressure  mmHg    Diastolic Blood Pressure  mmHg    Ventricular Rate 89 BPM    Atrial Rate 89 BPM    AZ Interval 150 ms    QRS Duration 96 ms    QT  "344 ms    QTc 418 ms    P Axis 53 degrees    R AXIS 61 degrees    T Axis 34 degrees    Interpretation ECG       Sinus rhythm  Normal ECG  No previous ECGs available     CBC with platelets + differential     Status: Abnormal    Narrative    The following orders were created for panel order CBC with platelets + differential.  Procedure                               Abnormality         Status                     ---------                               -----------         ------                     CBC with platelets and d...[[id number]]  Abnormal            Final result                 Please view results for these tests on the individual orders.     Medications   acetylcysteine 20 % (MUCOMYST) oral solution 5,082 mg (has no administration in time range)   sodium chloride 0.9% BOLUS 1,000 mL (0 mLs Intravenous Stopped 9/20/24 0502)   Tdap (tetanus-diphtheria-acell pertussis) (ADACEL) injection 0.5 mL (0.5 mLs Intramuscular $Given 9/20/24 0344)   acetylcysteine 20 % (MUCOMYST) oral solution 10,164 mg (10,164 mg Oral $Given 9/20/24 9564)   ondansetron (ZOFRAN) injection 4 mg (4 mg Intravenous $Given 9/20/24 5805)         Patient's response to treatments and/or interventions: Tolerated    To be done/followed up on inpatient unit:  Per inpatient provider orders    Does this patient have any cognitive concerns?:  None    Activity level - Baseline/Home:  Independent  Activity Level - Current:   Independent    Patient's Preferred language: English   Needed?: No    Isolation: None  Infection: Not Applicable  Patient tested for COVID 19 prior to admission: NO  Bariatric?: No    Vital Signs:   Vitals:    09/20/24 0223 09/20/24 0456   BP: (!) 136/91 132/79   Pulse: 84 99   Resp: 18 12   Temp: 99.1  F (37.3  C)    TempSrc: Oral    SpO2: 99%    Weight: 72.6 kg (160 lb)    Height: 1.676 m (5' 6\")        Cardiac Rhythm:     Was the PSS-3 completed:   Yes  What interventions are required if any?               Family " Comments: Mother by the bedside  OBS brochure/video discussed/provided to patient/family: No              Name of person given brochure if not patient:               Relationship to patient:     For the majority of the shift this patient's behavior was Green.   Behavioral interventions performed were None.    ED NURSE PHONE NUMBER: [phone]

## 2024-09-21 LAB
ALBUMIN SERPL BCG-MCNC: 3.9 G/DL (ref 3.5–5.2)
ALBUMIN SERPL BCG-MCNC: 4.1 G/DL (ref 3.5–5.2)
ALP SERPL-CCNC: 65 U/L (ref 40–150)
ALP SERPL-CCNC: 70 U/L (ref 40–150)
ALT SERPL W P-5'-P-CCNC: 27 U/L (ref 0–70)
ALT SERPL W P-5'-P-CCNC: 28 U/L (ref 0–70)
ANION GAP SERPL CALCULATED.3IONS-SCNC: 5 MMOL/L (ref 7–15)
APAP SERPL-MCNC: <5 UG/ML (ref 10–30)
AST SERPL W P-5'-P-CCNC: 24 U/L (ref 0–45)
AST SERPL W P-5'-P-CCNC: 26 U/L (ref 0–45)
BILIRUB DIRECT SERPL-MCNC: <0.2 MG/DL (ref 0–0.3)
BILIRUB SERPL-MCNC: 0.5 MG/DL
BILIRUB SERPL-MCNC: 0.5 MG/DL
BUN SERPL-MCNC: 12 MG/DL (ref 6–20)
CALCIUM SERPL-MCNC: 8.9 MG/DL (ref 8.8–10.4)
CHLORIDE SERPL-SCNC: 103 MMOL/L (ref 98–107)
CREAT SERPL-MCNC: 1.09 MG/DL (ref 0.67–1.17)
EGFRCR SERPLBLD CKD-EPI 2021: >90 ML/MIN/1.73M2
GLUCOSE SERPL-MCNC: 114 MG/DL (ref 70–99)
HCO3 SERPL-SCNC: 32 MMOL/L (ref 22–29)
INR PPP: 1.22 (ref 0.85–1.15)
INR PPP: 1.35 (ref 0.85–1.15)
POTASSIUM SERPL-SCNC: 3.7 MMOL/L (ref 3.4–5.3)
PROT SERPL-MCNC: 5.8 G/DL (ref 6.4–8.3)
PROT SERPL-MCNC: 6.2 G/DL (ref 6.4–8.3)
SODIUM SERPL-SCNC: 140 MMOL/L (ref 135–145)

## 2024-09-21 PROCEDURE — 99232 SBSQ HOSP IP/OBS MODERATE 35: CPT | Performed by: INTERNAL MEDICINE

## 2024-09-21 PROCEDURE — 85610 PROTHROMBIN TIME: CPT | Performed by: INTERNAL MEDICINE

## 2024-09-21 PROCEDURE — 80143 DRUG ASSAY ACETAMINOPHEN: CPT | Performed by: INTERNAL MEDICINE

## 2024-09-21 PROCEDURE — 36415 COLL VENOUS BLD VENIPUNCTURE: CPT | Performed by: INTERNAL MEDICINE

## 2024-09-21 PROCEDURE — 36415 COLL VENOUS BLD VENIPUNCTURE: CPT | Performed by: STUDENT IN AN ORGANIZED HEALTH CARE EDUCATION/TRAINING PROGRAM

## 2024-09-21 PROCEDURE — 250N000013 HC RX MED GY IP 250 OP 250 PS 637: Performed by: INTERNAL MEDICINE

## 2024-09-21 PROCEDURE — 85610 PROTHROMBIN TIME: CPT | Performed by: STUDENT IN AN ORGANIZED HEALTH CARE EDUCATION/TRAINING PROGRAM

## 2024-09-21 PROCEDURE — 120N000001 HC R&B MED SURG/OB

## 2024-09-21 PROCEDURE — 80076 HEPATIC FUNCTION PANEL: CPT | Performed by: INTERNAL MEDICINE

## 2024-09-21 RX ORDER — GLIPIZIDE 10 MG/1
1 TABLET ORAL 2 TIMES DAILY PRN
Status: DISCONTINUED | OUTPATIENT
Start: 2024-09-21 | End: 2024-09-23 | Stop reason: HOSPADM

## 2024-09-21 RX ORDER — ALBUTEROL SULFATE 90 UG/1
1-2 AEROSOL, METERED RESPIRATORY (INHALATION) EVERY 6 HOURS PRN
Status: DISCONTINUED | OUTPATIENT
Start: 2024-09-21 | End: 2024-09-23 | Stop reason: HOSPADM

## 2024-09-21 RX ADMIN — CALCIUM CARBONATE (ANTACID) CHEW TAB 500 MG 1000 MG: 500 CHEW TAB at 10:44

## 2024-09-21 RX ADMIN — DEXTRAN 70, GLYCERIN, HYPROMELLOSE 1 DROP: 1; 2; 3 SOLUTION/ DROPS OPHTHALMIC at 14:14

## 2024-09-21 ASSESSMENT — ACTIVITIES OF DAILY LIVING (ADL)
ADLS_ACUITY_SCORE: 18

## 2024-09-21 NOTE — PLAN OF CARE
Goal Outcome Evaluation:              9/21/24 1160-8880     Orientation: A/Ox4. Calm. Flat.   Aggression Stop Light: green  Activity: Ind in room.   Diet/BS Checks: Regular, safe tray. Encourage PO liquid intake.   Tele:  N/A  IV Access/Drains:1 PIV SL.    Pain Management: Denies pain and nausea. Just a bit discomfort stomach. Gave TUMS x1.   Abnormal VS/Results: VSS on RA  Bowel/Bladder: Continent B/B. Good voiding.   Skin/Wounds: Wounds on wrists and ankles from self harm PTA. Wound care wrists and ankles done this shift, dressing CDI ,follow WOC orders.   Consults: WOC, psych  D/C Disposition: pending to inpatient psych once medically stable per psych note  Other Info:      -Denied thoughts of suicide.

## 2024-09-21 NOTE — PLAN OF CARE
Orientation: A&Ox4 - calm, flat   Aggression Stop Light: Green  Activity: Independent   Diet/BS Checks: Regular diet, safe tray   Tele:  N/a  IV Access/Drains: R PIV SL   Pain Management: Denies pain   Abnormal VS/Results: VSS on RA  Bowel/Bladder: Continent B/B  Skin/Wounds: Bilat wrist wounds - dressing changed this shift, bilat ankle wounds - dressing CDI  Consults: Psych   D/C Disposition: Pending inpatient psych placement   Other Info:   -Acetadote stopped this shift, labs WNL  -PRN tums x1

## 2024-09-21 NOTE — CONSULTS
SW: Once patient is medically stable, charge RN will call to get patient on the work list for IP Psych. No needs from SW at this time. No further care management intervention anticipated at this time.  Please re-consult if further needs arise.  Care management signing off.      USAMA Shabazz

## 2024-09-21 NOTE — PROGRESS NOTES
"[facility]    Internal Medicine Hospitalist Progress Note  09/21/2024  I evaluated patient on the above date.    Stephne Rapp Jr., MD  [phone] (p)  Text Page  Vocera      [New actions/orders today (09/21/2024) are underlined in the assessment and plan. All lab results in the assessment and plan were reviewed.]  Assessment & Plan      Chuckie Manzano is a 29 year old male with history increase depression/anxiety, who presented 9/20/2024 after a suicide attempt.    From H&P 9/20:  \"Pt states he's been planning suicide for 1.5 years, depression longer, \"not enjoying life\". Also mentions trauma that he didn't get into. Today, first tried CO by trying plugging tailpipe (didn't work, minimal CO exposure), next cut wrists and ankles. Then took 80 pills of unclear strength tylenol. Also drank 3/4 of 750 ml vodka. Some nausea/ vomiting. In ED AFVSS. AST/ALT normal, TB 1.4. tylenol level 77.7. WBC 13.7. EKG normal.\"      Acetaminophen overdose.  Upper and lower extremity lacerations.  Depression/anxiety.  Reported ADD.  * Initial presentation as above. Case d/w poison control. Started on NAC protocol. Placed on 72h hold. WOC consult for wounds.  * Later on 9/20, follow-up acetaminophen level <5. NAC stopped/completed. 72h hold stopped as pt consented to voluntary  admission. Sitter discontinued.  * On 9/21, early am, LFT's normal, INR 1.35.   Recent Labs   Lab 09/21/24  0003 09/20/24  1431 09/20/24  0308   ALBUMIN 4.1  --  5.1  5.0   BILITOTAL 0.5  --  1.4*  1.4*   ALT 28  --  36  36   AST 26  --  35  35   ALKPHOS 70  --  75  76   INR 1.35* 1.38* 1.35*   CR  --   --  1.21*   - Patient is voluntary, planning to discharge to inpatient psych when medically cleared; hold if tries to leave.  - Follow-up acetaminophen level this am pending.  - LFT's in am.  - Continue local wound cares per WOC RN rec's.    Mild CHRISTELLE, suspect prerenal.  * Initial presentation as above. Cr 1.21 on admit. Started " "on IVF's on admit.  Recent Labs   Lab 09/20/24  0308   CR 1.21*   Estimated Creatinine Clearance: 95.5 mL/min (A) (based on SCr of 1.21 mg/dL (H)).  - Continue IVF's. - anticipate discontinue IVF's if cr normalized.  - Continue to monitor BMP - repeat this am.  - Avoid nephrotoxic medications.    Leukocytosis, suspect reactive.  * WBC 13.7 on admit. Pt afebrile, no clear signs of infection on admission.  Recent Labs   Lab 09/20/24  0308   WBC 13.7*   - Continue to monitor CBC - repeat in am.  - Continue to monitor for signs of infection.  - Continue to treat other issues as noted.    Clinically Significant Risk Factors               # Coagulation Defect: INR = 1.35 (Ref range: 0.85 - 1.15) and/or PTT = N/A, will monitor for bleeding              # Overweight: Estimated body mass index is 26.18 kg/m  as calculated from the following:    Height as of this encounter: 1.676 m (5' 6\").    Weight as of this encounter: 73.6 kg (162 lb 3.2 oz)., PRESENT ON ADMISSION     # Financial/Environmental Concerns: none         Diet: Combination Diet Regular Diet; Safe Tray - with utensils    Prophylaxis: PCD's and ambulation  Philippe Catheter: Not present  Lines: None     Code Status: Full Code    Disposition Plan   Medically Ready for Discharge: Anticipated in 1-2 Days  Expected discharge to  [facility] treatment facility  pending above.    Entered: Stephen Rapp MD 09/21/2024, 7:45 AM               Interval History   Doing OK overall.  No chest pain or dyspnea.    * Data reviewed today: I reviewed all new labs and imaging over the last 24 hours. I personally reviewed no images or ECG's today.    Physical Exam   Most recent vitals:   , Blood pressure 121/75, pulse 94, temperature 98.5  F (36.9  C), temperature source Oral, resp. rate 18, height 1.676 m (5' 6\"), weight 73.6 kg (162 lb 3.2 oz), SpO2 98%. O2 Device: None (Room air)    Vitals:    09/20/24 0223 09/21/24 0625   Weight: 72.6 kg (160 lb) 73.6 kg (162 lb 3.2 oz) "     Vital signs with ranges:  Temp:  [98.2  F (36.8  C)-98.7  F (37.1  C)] 98.5  F (36.9  C)  Pulse:  [91-95] 94  Resp:  [16-18] 18  BP: (121-141)/(68-92) 121/75  SpO2:  [96 %-98 %] 98 %  Patient Vitals for the past 24 hrs:   BP Temp Temp src Pulse Resp SpO2 Weight   09/21/24 0625 -- -- -- -- -- -- 73.6 kg (162 lb 3.2 oz)   09/21/24 0148 121/75 98.5  F (36.9  C) Oral 94 18 98 % --   09/20/24 2231 126/76 98.7  F (37.1  C) Oral 95 17 98 % --   09/20/24 1512 (!) 141/92 98.3  F (36.8  C) Oral 94 16 96 % --   09/20/24 0852 -- 98.2  F (36.8  C) Oral -- -- -- --   09/20/24 0826 -- -- -- -- -- 98 % --   09/20/24 0825 137/68 -- -- 91 -- -- --     I/O's last 24 hours:  No intake/output data recorded.    Constitutional: awake, alert, oriented   Head:   Eyes:   ENT:   Neck:   Cardiovascular: regular rate and rhythm; no murmurs, rubs or gallops  Lungs: clear to auscultation bilaterally without crackles or wheezes  Gastrointestinal/Abdomen: soft, non-tender, non-distended, positive bowel sounds  :   Musculoskeletal:   Skin/Extremities:   Neurologic:   Psychiatric:   Hematologic/Lymphatic/Immunologic:        Data    Labs reviewed.  Recent Labs   Lab 09/21/24  0003 09/20/24  1431 09/20/24  0308   WBC  --   --  13.7*   HGB  --   --  16.4   MCV  --   --  88   PLT  --   --  321   INR 1.35* 1.38* 1.35*   NA  --   --  138   POTASSIUM  --   --  4.1   CHLORIDE  --   --  96*   CO2  --   --  27   BUN  --   --  15.0   CR  --   --  1.21*   ANIONGAP  --   --  15   MARQUEZ  --   --  9.9   GLC  --   --  124*   ALBUMIN 4.1  --  5.1  5.0   PROTTOTAL 6.2*  --  7.5  7.6   BILITOTAL 0.5  --  1.4*  1.4*   ALKPHOS 70  --  75  76   ALT 28  --  36  36   AST 26  --  35  35     No lab results found.  Recent Labs   Lab 09/20/24  0308   *     No lab results found.    Recent Labs   Lab 09/21/24  0003 09/20/24  1431 09/20/24  0308   INR 1.35* 1.38* 1.35*     Recent Labs   Lab 09/20/24  0308   WBC 13.7*       MICRO:  Cultures (including blood and  urine):  No lab results found in last 7 days.    No results found for this or any previous visit (from the past 24 hour(s)).    Medications   All medications were reviewed. MAR.    Infusions:  Current Facility-Administered Medications   Medication Dose Route Frequency Provider Last Rate Last Admin    sodium chloride 0.9 % infusion   Intravenous Continuous Ck Lomas MD   Paused at 09/20/24 0826     Scheduled Medications:  Current Facility-Administered Medications   Medication Dose Route Frequency Provider Last Rate Last Admin    sodium chloride (PF) 0.9% PF flush 3 mL  3 mL Intracatheter Q8H Ck Lomas MD         PRN Medications:  Current Facility-Administered Medications   Medication Dose Route Frequency Provider Last Rate Last Admin    calcium carbonate (TUMS) chewable tablet 1,000 mg  1,000 mg Oral 4x Daily PRN Ck Lomas MD   1,000 mg at 09/20/24 2258    hydrOXYzine HCl (ATARAX) tablet 50 mg  50 mg Oral Q6H PRN Crispin Manrique MD        ibuprofen (ADVIL/MOTRIN) tablet 600 mg  600 mg Oral Q6H PRN Ck Lomas MD        lidocaine (LMX4) cream   Topical Q1H PRN Ck Lomas MD        lidocaine 1 % 0.1-1 mL  0.1-1 mL Other Q1H PRN Ck Lomas MD        loperamide (IMODIUM) capsule 2 mg  2 mg Oral 4x Daily PRN Gunner Nails MD   2 mg at 09/20/24 1449    ondansetron (ZOFRAN ODT) ODT tab 4 mg  4 mg Oral Q6H PRN Ck Lomas MD        Or    ondansetron (ZOFRAN) injection 4 mg  4 mg Intravenous Q6H PRN Ck Lomas MD        senna-docusate (SENOKOT-S/PERICOLACE) 8.6-50 MG per tablet 1 tablet  1 tablet Oral BID PRN Ck Lomas MD        Or    senna-docusate (SENOKOT-S/PERICOLACE) 8.6-50 MG per tablet 2 tablet  2 tablet Oral BID PRN Ck Lomas MD        sodium chloride (PF) 0.9% PF flush 3 mL  3 mL Intracatheter q1 min prn Ck Lomas MD

## 2024-09-22 LAB
ALBUMIN SERPL BCG-MCNC: 4.1 G/DL (ref 3.5–5.2)
ALP SERPL-CCNC: 63 U/L (ref 40–150)
ALT SERPL W P-5'-P-CCNC: 39 U/L (ref 0–70)
ANION GAP SERPL CALCULATED.3IONS-SCNC: 8 MMOL/L (ref 7–15)
AST SERPL W P-5'-P-CCNC: 40 U/L (ref 0–45)
BASOPHILS # BLD AUTO: 0 10E3/UL (ref 0–0.2)
BASOPHILS NFR BLD AUTO: 0 %
BILIRUB DIRECT SERPL-MCNC: <0.2 MG/DL (ref 0–0.3)
BILIRUB SERPL-MCNC: 0.3 MG/DL
BUN SERPL-MCNC: 10.8 MG/DL (ref 6–20)
CALCIUM SERPL-MCNC: 9 MG/DL (ref 8.8–10.4)
CHLORIDE SERPL-SCNC: 104 MMOL/L (ref 98–107)
CREAT SERPL-MCNC: 1.17 MG/DL (ref 0.67–1.17)
EGFRCR SERPLBLD CKD-EPI 2021: 88 ML/MIN/1.73M2
EOSINOPHIL # BLD AUTO: 0.3 10E3/UL (ref 0–0.7)
EOSINOPHIL NFR BLD AUTO: 4 %
ERYTHROCYTE [DISTWIDTH] IN BLOOD BY AUTOMATED COUNT: 12 % (ref 10–15)
GLUCOSE SERPL-MCNC: 101 MG/DL (ref 70–99)
HCO3 SERPL-SCNC: 30 MMOL/L (ref 22–29)
HCT VFR BLD AUTO: 39.4 % (ref 40–53)
HGB BLD-MCNC: 13.2 G/DL (ref 13.3–17.7)
IMM GRANULOCYTES # BLD: 0 10E3/UL
IMM GRANULOCYTES NFR BLD: 0 %
INR PPP: 1.13 (ref 0.85–1.15)
LYMPHOCYTES # BLD AUTO: 1.6 10E3/UL (ref 0.8–5.3)
LYMPHOCYTES NFR BLD AUTO: 23 %
MCH RBC QN AUTO: 30.6 PG (ref 26.5–33)
MCHC RBC AUTO-ENTMCNC: 33.5 G/DL (ref 31.5–36.5)
MCV RBC AUTO: 91 FL (ref 78–100)
MONOCYTES # BLD AUTO: 0.5 10E3/UL (ref 0–1.3)
MONOCYTES NFR BLD AUTO: 7 %
NEUTROPHILS # BLD AUTO: 4.6 10E3/UL (ref 1.6–8.3)
NEUTROPHILS NFR BLD AUTO: 66 %
NRBC # BLD AUTO: 0 10E3/UL
NRBC BLD AUTO-RTO: 0 /100
PLATELET # BLD AUTO: 244 10E3/UL (ref 150–450)
POTASSIUM SERPL-SCNC: 3.9 MMOL/L (ref 3.4–5.3)
PROT SERPL-MCNC: 6 G/DL (ref 6.4–8.3)
RBC # BLD AUTO: 4.31 10E6/UL (ref 4.4–5.9)
SODIUM SERPL-SCNC: 142 MMOL/L (ref 135–145)
WBC # BLD AUTO: 7 10E3/UL (ref 4–11)

## 2024-09-22 PROCEDURE — 36415 COLL VENOUS BLD VENIPUNCTURE: CPT | Performed by: STUDENT IN AN ORGANIZED HEALTH CARE EDUCATION/TRAINING PROGRAM

## 2024-09-22 PROCEDURE — 85025 COMPLETE CBC W/AUTO DIFF WBC: CPT | Performed by: INTERNAL MEDICINE

## 2024-09-22 PROCEDURE — 120N000001 HC R&B MED SURG/OB

## 2024-09-22 PROCEDURE — 99232 SBSQ HOSP IP/OBS MODERATE 35: CPT | Performed by: INTERNAL MEDICINE

## 2024-09-22 PROCEDURE — 80053 COMPREHEN METABOLIC PANEL: CPT | Performed by: INTERNAL MEDICINE

## 2024-09-22 PROCEDURE — 85610 PROTHROMBIN TIME: CPT | Performed by: STUDENT IN AN ORGANIZED HEALTH CARE EDUCATION/TRAINING PROGRAM

## 2024-09-22 PROCEDURE — 82248 BILIRUBIN DIRECT: CPT | Performed by: INTERNAL MEDICINE

## 2024-09-22 RX ORDER — IBUPROFEN 600 MG/1
600 TABLET, FILM COATED ORAL EVERY 6 HOURS PRN
DISCHARGE
Start: 2024-09-22

## 2024-09-22 ASSESSMENT — ACTIVITIES OF DAILY LIVING (ADL)
ADLS_ACUITY_SCORE: 18

## 2024-09-22 NOTE — PLAN OF CARE
Goal Outcome Evaluation:           9/22/24 2381-4450     Orientation: DEONTE/Tatum4. Calm. Cooperative.   Aggression Stop Light: green  Activity: Ind in room.   Diet/BS Checks: Regular, safe tray. Encourage PO liquid intake.   Tele:  N/A  IV Access/Drains:1 PIV SL patent.  Pain Management: Denies pain and nausea. A bit discomfort stomach.   Abnormal VS/Results: VSS on RA  Bowel/Bladder: Continent B/B. Good voiding. 1 BM.   Skin/Wounds: Wounds on wrists and ankles from self harm PTA. Wound care wrists and ankles done this shift, sutures healing wound- dressing CDI ,follow WOC orders. Plan to remove wound sutures 9/27-9/30  Consults: WOC, psych  D/C Disposition: pending to inpatient psych once medically stable per psych note  Other Info:      -Denied thoughts of suicide.  -Psych intake requests new order for Psych consult to update to proceed. RN will call Psych intake  [phone] again once updates from Psych posted.

## 2024-09-22 NOTE — PLAN OF CARE
Orientation: A&Ox4 - calm, cooperative   Aggression Stop Light: Green  Activity: Independent   Diet/BS Checks: Regular diet, safe tray   Tele:  N/a  IV Access/Drains: R PIV SL   Pain Management: Denies pain   Abnormal VS/Results: VSS on RA  Bowel/Bladder: Continent B/B  Skin/Wounds: Bilat wrist wounds - dressing changed this shift, bilat ankle wounds - dressing CDI  Consults: Psych   D/C Disposition: Pending inpatient psych placement   Other Info:

## 2024-09-22 NOTE — PROGRESS NOTES
"[facility]    Internal Medicine Hospitalist Progress Note  09/22/2024  I evaluated patient on the above date.    Stephen Rapp Jr., MD  [phone] (p)  Text Page  Vocera      [New actions/orders today (09/22/2024) are underlined in the assessment and plan. All lab results in the assessment and plan were reviewed.]  Assessment & Plan      Chuckie Manzano is a 29 year old male with history increase depression/anxiety, who presented 9/20/2024 after a suicide attempt.    From H&P 9/20:  \"Pt states he's been planning suicide for 1.5 years, depression longer, \"not enjoying life\". Also mentions trauma that he didn't get into. Today, first tried CO by trying plugging tailpipe (didn't work, minimal CO exposure), next cut wrists and ankles. Then took 80 pills of unclear strength tylenol. Also drank 3/4 of 750 ml vodka. Some nausea/ vomiting. In ED AFVSS. AST/ALT normal, TB 1.4. tylenol level 77.7. WBC 13.7. EKG normal.\"      Acetaminophen overdose.  Depression/anxiety.  Reported ADD.  * Initial presentation as above. Case d/w poison control. Started on NAC protocol. Placed on 72h hold. [facility] consult for wounds.  * Later on 9/20, follow-up acetaminophen level <5. NAC stopped/completed. 72h hold stopped as pt consented to voluntary  admission. Sitter discontinued.  * On 9/21, early am, LFT's normal, INR 1.35. Acetaminophen level < 5.  Recent Labs   Lab 09/21/24  1257 09/21/24  0003 09/20/24  1431 09/20/24  0308   ALBUMIN 3.9 4.1  --  5.1  5.0   BILITOTAL 0.5 0.5  --  1.4*  1.4*   ALT 27 28  --  36  36   AST 24 26  --  35  35   ALKPHOS 65 70  --  75  76   INR 1.22* 1.35* 1.38* 1.35*   CR 1.09  --   --  1.21*   - Patient is voluntary, plan discharge to inpatient psych - medically cleared for discharge today 9/22.  - If tries to leave, place on hold.    Upper and lower extremity lacerations, self inflicted.  * Initial presentation as above. Pt with self inflicted lacerations (five total) which were " "sutured in the ED: 10 sutures left forearm; 6 sutures right distal forearm; 7 sutures right proximal forearm; 2 sutures right leg; 8 sutures left leg.  - Continue local wound cares per [facility] RN and ED instructions.  - Suture removal 7-10 days from placement (~9/27-9/30).    Mild CHRISTELLE, suspect prerenal.  * Initial presentation as above. Cr 1.21 on admit. Started on IVF's on admit.  * Cr normalized and IVF's discontinued 9/21.    Leukocytosis, suspect reactive.  * WBC 13.7 on admit. Pt afebrile, no clear signs of infection on admission.  Recent Labs   Lab 09/20/24  0308   WBC 13.7*   - Continue to monitor CBC - repeat pending this am.  - Continue to monitor for signs of infection.  - Continue to treat other issues as noted.    Clinically Significant Risk Factors               # Coagulation Defect: INR = 1.22 (Ref range: 0.85 - 1.15) and/or PTT = N/A, will monitor for bleeding              # Overweight: Estimated body mass index is 26.31 kg/m  as calculated from the following:    Height as of this encounter: 1.676 m (5' 6\").    Weight as of this encounter: 73.9 kg (163 lb)., PRESENT ON ADMISSION       # Financial/Environmental Concerns: none         Diet: Combination Diet Regular Diet; Safe Tray - with utensils    Prophylaxis: PCD's and ambulation  Philippe Catheter: Not present  Lines: None     Code Status: Full Code    Disposition Plan   Medically Ready for Discharge: Ready Now  Expected discharge to  mental health treatment facility  pending  inpatient mental health bed availability .    Entered: Stephen Rapp MD 09/22/2024, 8:01 AM           Interval History   Doing OK overall.  Some soreness from lacerations, but able to ambulate and perform ADL's.    * Data reviewed today: I reviewed all new labs and imaging over the last 24 hours. I personally reviewed no images or ECG's today.    Physical Exam   Most recent vitals:   , Blood pressure 128/80, pulse 79, temperature 98.6  F (37  C), temperature source Oral, resp. " "rate 18, height 1.676 m (5' 6\"), weight 73.9 kg (163 lb), SpO2 97%. O2 Device: None (Room air)    Vitals:    09/20/24 0223 09/21/24 0625 09/22/24 0645   Weight: 72.6 kg (160 lb) 73.6 kg (162 lb 3.2 oz) 73.9 kg (163 lb)     Vital signs with ranges:  Temp:  [97.9  F (36.6  C)-98.6  F (37  C)] 98.6  F (37  C)  Pulse:  [70-86] 79  Resp:  [16-18] 18  BP: (118-129)/(70-80) 128/80  SpO2:  [97 %-99 %] 97 %  Patient Vitals for the past 24 hrs:   BP Temp Temp src Pulse Resp SpO2 Weight   09/22/24 0645 -- -- -- -- -- -- 73.9 kg (163 lb)   09/21/24 2020 128/80 98.6  F (37  C) Oral 79 18 97 % --   09/21/24 1540 129/78 98.6  F (37  C) Oral 83 18 97 % --   09/21/24 1052 118/70 98.1  F (36.7  C) Oral 86 16 98 % --   09/21/24 0817 128/80 97.9  F (36.6  C) Oral 70 16 99 % --     I/O's last 24 hours:  No intake/output data recorded.    Constitutional: awake, alert, oriented   Head:   Eyes:   ENT:   Neck:   Cardiovascular: regular rate and rhythm; no murmurs, rubs or gallops  Lungs: clear to auscultation bilaterally without crackles or wheezes  Gastrointestinal/Abdomen: soft, non-tender, non-distended, positive bowel sounds  :   Musculoskeletal:   Skin/Extremities: Multiple dressings/wraps bilateral upper and lower extremity lacerations.  Neurologic:   Psychiatric:   Hematologic/Lymphatic/Immunologic:        Data    Labs reviewed.  Recent Labs   Lab 09/21/24  1257 09/21/24  0003 09/20/24  1431 09/20/24  0308   WBC  --   --   --  13.7*   HGB  --   --   --  16.4   MCV  --   --   --  88   PLT  --   --   --  321   INR 1.22* 1.35* 1.38* 1.35*     --   --  138   POTASSIUM 3.7  --   --  4.1   CHLORIDE 103  --   --  96*   CO2 32*  --   --  27   BUN 12.0  --   --  15.0   CR 1.09  --   --  1.21*   ANIONGAP 5*  --   --  15   MARQUEZ 8.9  --   --  9.9   *  --   --  124*   ALBUMIN 3.9 4.1  --  5.1  5.0   PROTTOTAL 5.8* 6.2*  --  7.5  7.6   BILITOTAL 0.5 0.5  --  1.4*  1.4*   ALKPHOS 65 70  --  75  76   ALT 27 28  --  36  36   AST 24 " 26  --  35  35     No lab results found.  Recent Labs   Lab 09/21/24  1257 09/20/24  0308   * 124*     No lab results found.    Recent Labs   Lab 09/21/24  1257 09/21/24  0003 09/20/24  1431 09/20/24  0308   INR 1.22* 1.35* 1.38* 1.35*     Recent Labs   Lab 09/20/24  0308   WBC 13.7*       MICRO:  Cultures (including blood and urine):  No lab results found in last 7 days.    No results found for this or any previous visit (from the past 24 hour(s)).    Medications   All medications were reviewed. MAR.    Infusions:  Current Facility-Administered Medications   Medication Dose Route Frequency Provider Last Rate Last Admin     Scheduled Medications:  Current Facility-Administered Medications   Medication Dose Route Frequency Provider Last Rate Last Admin    sodium chloride (PF) 0.9% PF flush 3 mL  3 mL Intracatheter Q8H Ck Lomas MD   3 mL at 09/21/24 1045     PRN Medications:  Current Facility-Administered Medications   Medication Dose Route Frequency Provider Last Rate Last Admin    albuterol (PROVENTIL HFA/VENTOLIN HFA) inhaler  1-2 puff Inhalation Q6H PRN Stephen Rapp MD        artificial tears (GENTEAL) 0.1-0.2-0.3 % ophthalmic solution 1 drop  1 drop Both Eyes BID PRN Ck Lomas MD   1 drop at 09/21/24 1414    calcium carbonate (TUMS) chewable tablet 1,000 mg  1,000 mg Oral 4x Daily PRN Ck Lomas MD   1,000 mg at 09/21/24 1044    hydrOXYzine HCl (ATARAX) tablet 50 mg  50 mg Oral Q6H PRN Crispin Manrique MD        ibuprofen (ADVIL/MOTRIN) tablet 600 mg  600 mg Oral Q6H PRN Ck Lomas MD        lidocaine (LMX4) cream   Topical Q1H PRN Ck Lomas MD        lidocaine 1 % 0.1-1 mL  0.1-1 mL Other Q1H PRN Ck Lomas MD        loperamide (IMODIUM) capsule 2 mg  2 mg Oral 4x Daily PRN Gunner Nails MD   2 mg at 09/20/24 1449    ondansetron (ZOFRAN ODT) ODT tab 4 mg  4 mg Oral Q6H PRN Ck Lomas  MD Abelino        Or    ondansetron (ZOFRAN) injection 4 mg  4 mg Intravenous Q6H PRN Ck Lomas MD        senna-docusate (SENOKOT-S/PERICOLACE) 8.6-50 MG per tablet 1 tablet  1 tablet Oral BID PRN Ck Lomas MD        Or    senna-docusate (SENOKOT-S/PERICOLACE) 8.6-50 MG per tablet 2 tablet  2 tablet Oral BID PRN Ck Lomas MD        sodium chloride (PF) 0.9% PF flush 3 mL  3 mL Intracatheter q1 min prn Ck Lomas MD   3 mL at 09/21/24 0739

## 2024-09-22 NOTE — DISCHARGE SUMMARY
[facility]  Discharge Summary        Chuckie Manzano MRN# [id number]   YOB: 1995 Age: 29 year old     Date of Admission: 9/20/2024  Date of Discharge: 09/22/2024  Admitting Physician: Ck Lomas MD  Discharge Physician: Stephen Rapp MD     Primary Provider: Salome Duke  Primary Care Physician Phone Number: [phone]         Discharge Diagnoses:   Acetaminophen overdose.  Upper and lower extremity lacerations, self inflicted.  Depression/anxiety with suicide attempt.  Mild CHRISTELLE, suspect prerenal.  Leukocytosis, suspect reactive.        Other Chronic Medical Problems:      Reported ADD.       Allergies:         Allergies   Allergen Reactions    Peanut (Diagnostic) Dermatitis     Causes burning of skin - immediate sores in mouth and on lips    Sulfa Antibiotics Dermatitis     Sulfa caused severe flares of eczema as an infant    Doxycycline Nausea and Vomiting    Menactra [Mening Acy&W-135 Diphth Conj] Rash    Trifluoperazine Swelling and Rash           Discharge Medications:        Current Discharge Medication List        START taking these medications    Details   ibuprofen (ADVIL/MOTRIN) 600 MG tablet Take 1 tablet (600 mg) by mouth every 6 hours as needed for inflammatory pain.    Associated Diagnoses: Deliberate self-cutting           CONTINUE these medications which have NOT CHANGED    Details   albuterol (PROAIR HFA/PROVENTIL HFA/VENTOLIN HFA) 108 (90 Base) MCG/ACT inhaler Inhale 1-2 puffs into the lungs every 6 hours as needed      Oxymetazoline HCl (UPNEEQ) 0.1 % SOLN Apply 1 drop to eye as needed. Pt to use own.  Has 1 sample left from Ophthalmologist.      polyethylene glycol 400 (BLINK TEARS) 0.25 % SOLN ophthalmic solution Place 1 drop into both eyes 2 times daily as needed for dry eyes.                 Discharge Instructions and Follow-Up:      Discharge Orders      General info for SNF    Length of Stay Estimate: Short Term Care:  "Estimated # of Days <30  Condition at Discharge: Improving  Level of care:skilled   Rehabilitation Potential: Good  Admission H&P remains valid and up-to-date: Yes  Recent Chemotherapy: N/A  Use Nursing Home Standing Orders: Yes     Mantoux instructions    Give two-step Mantoux (PPD) Per Facility Policy Yes     Follow Up and recommended labs and tests    1. Follow-up with mental health providers.  2. Follow-up with primary care provider after mental health hospitalization.     Activity - Up with nursing assistance     Reason for your hospital stay    1. Acetaminophen overdose.  2. Upper and lower extremity lacerations, self inflicted.  3. Depression/anxiety with suicide attempt.  4. Mild CHRISTELLE, suspect prerenal.  5. Leukocytosis, suspect reactive.     Discharge Instructions    Suture removal from bilateral upper and lower extremities ~9/27-9/30/2024.     Diet    Follow this diet upon discharge: Orders Placed This Encounter      Combination Diet Regular Diet; Safe Tray - with utensils             Consultations This Hospital Stay:      DIAGNOSTIC EVALUATION CENTER (DEC) ASSESSMENT ORDER  PSYCHIATRY IP CONSULT  WOUND OSTOMY CONTINENCE NURSE  IP CONSULT  CARE MANAGEMENT / SOCIAL WORK IP CONSULT        Admission History:      Please see the H&P by Ck Lomas MD on 9/20/2024 for complete details. Briefly, Chuckie Manzano is a 29 year old male with history increase depression/anxiety, who presented 9/20/2024 after a suicide attempt.    From H&P 9/20:  \"Pt states he's been planning suicide for 1.5 years, depression longer, \"not enjoying life\". Also mentions trauma that he didn't get into. Today, first tried CO by trying plugging tailpipe (didn't work, minimal CO exposure), next cut wrists and ankles. Then took 80 pills of unclear strength tylenol. Also drank 3/4 of 750 ml vodka. Some nausea/ vomiting. In ED AFVSS. AST/ALT normal, TB 1.4. tylenol level 77.7. WBC 13.7. EKG normal.\"        [facility] " "Course:        Acetaminophen overdose.  Depression/anxiety.  Reported ADD.  * Initial presentation as above. Case d/w poison control. Started on NAC protocol. Placed on 72h hold. [facility] consult for wounds.  * Later on 9/20, follow-up acetaminophen level <5. NAC stopped/completed. 72h hold stopped as pt consented to voluntary  admission. Sitter discontinued.  * On 9/21, early am, LFT's normal, INR 1.35. Acetaminophen level < 5.  Recent Labs   Lab 09/21/24  1257 09/21/24  0003 09/20/24  1431 09/20/24  0308   ALBUMIN 3.9 4.1  --  5.1  5.0   BILITOTAL 0.5 0.5  --  1.4*  1.4*   ALT 27 28  --  36  36   AST 24 26  --  35  35   ALKPHOS 65 70  --  75  76   INR 1.22* 1.35* 1.38* 1.35*   CR 1.09  --   --  1.21*   - Plan discharge to inpatient [facility] health facility.    Upper and lower extremity lacerations, self inflicted.  * Initial presentation as above. Pt with self inflicted lacerations (five total) which were sutured in the ED: 10 sutures left forearm; 6 sutures right distal forearm; 7 sutures right proximal forearm; 2 sutures right leg; 8 sutures left leg.  - Continue local wound cares per [facility] RN and ED instructions.  - Continue PRN ibuprofen.  - Suture removal 7-10 days from placement (~9/27-9/30).    Mild CHRISTELLE, suspect prerenal.  * Initial presentation as above. Cr 1.21 on admit. Started on IVF's on admit.  * Cr normalized and IVF's discontinued 9/21.    Leukocytosis, suspect reactive.  * WBC 13.7 on admit. Pt afebrile, no clear signs of infection on admission.  Recent Labs   Lab 09/20/24  0308   WBC 13.7*   - Continue to monitor for signs of infection.  - Continue to treat other issues as noted.      Clinically Significant Risk Factors               # Coagulation Defect: INR = 1.22 (Ref range: 0.85 - 1.15) and/or PTT = N/A, will monitor for bleeding              # Overweight: Estimated body mass index is 26.31 kg/m  as calculated from the following:    Height as of this encounter: 1.676 m (5' 6\").    Weight as of " this encounter: 73.9 kg (163 lb)., PRESENT ON ADMISSION       # Financial/Environmental Concerns: none               Pending Results:        Unresulted Labs Ordered in the Past 30 Days of this Admission       No orders found from 8/21/2024 to 9/21/2024.                  Discharge Disposition:      Discharge to mental health facility.        Discharge Time:      Approximately 30 minutes          Condition and Physical on Discharge:    See progress note on the same date as this discharge summary.          Key Imaging Studies, Lab Findings and Procedures/Surgeries:      No results found for this or any previous visit.

## 2024-09-23 VITALS
HEIGHT: 66 IN | DIASTOLIC BLOOD PRESSURE: 71 MMHG | SYSTOLIC BLOOD PRESSURE: 113 MMHG | WEIGHT: 163 LBS | RESPIRATION RATE: 18 BRPM | HEART RATE: 65 BPM | TEMPERATURE: 97.9 F | BODY MASS INDEX: 26.2 KG/M2 | OXYGEN SATURATION: 97 %

## 2024-09-23 PROCEDURE — 99233 SBSQ HOSP IP/OBS HIGH 50: CPT | Performed by: PSYCHIATRY & NEUROLOGY

## 2024-09-23 PROCEDURE — 99239 HOSP IP/OBS DSCHRG MGMT >30: CPT | Performed by: INTERNAL MEDICINE

## 2024-09-23 PROCEDURE — 250N000013 HC RX MED GY IP 250 OP 250 PS 637: Performed by: PSYCHIATRY & NEUROLOGY

## 2024-09-23 RX ORDER — ESCITALOPRAM OXALATE 5 MG/1
5 TABLET ORAL DAILY
Status: DISCONTINUED | OUTPATIENT
Start: 2024-09-23 | End: 2024-09-23 | Stop reason: HOSPADM

## 2024-09-23 RX ORDER — ESCITALOPRAM OXALATE 5 MG/1
TABLET ORAL
Qty: 75 TABLET | Refills: 1 | Status: SHIPPED | OUTPATIENT
Start: 2024-09-23 | End: 2024-10-30

## 2024-09-23 RX ORDER — ESCITALOPRAM OXALATE 10 MG/1
10 TABLET ORAL DAILY
Status: DISCONTINUED | OUTPATIENT
Start: 2024-09-30 | End: 2024-09-23 | Stop reason: HOSPADM

## 2024-09-23 RX ADMIN — ESCITALOPRAM OXALATE 5 MG: 5 TABLET, FILM COATED ORAL at 12:46

## 2024-09-23 ASSESSMENT — ACTIVITIES OF DAILY LIVING (ADL)
ADLS_ACUITY_SCORE: 18
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
## 2024-09-23 NOTE — CONSULTS
"  [facility]     CONSULT PSYCHIATRY  FOLLOW UP NOTE     DATE OF SERVICE   09/23/2024       IDENTIFICATION   Chuckie Manzano  Age: 29 year old  MRN# [id number]   YOB: 1995  Length of Stay:  3        CHIEF COMPLAINT   \"I'm safe.\"       CONSULT REQUEST BY   Shyla Hurtado MD re: Psychiatry re-evaluation.       SUBJECTIVE   The patient's care was discussed with primary treatment team directly and patient's electronic chart notes were reviewed.      Staff report patient did not report any acute medical concerns or side effects. Plan to remove wound sutures 9/27 - 9/30. No behavioral issues overnight, including violent or aggressive behaviors. Patient did not require seclusion or restraints. Patient is not exhibiting signs or symptoms of psychosis or shane. Patient did not endorse suicidal ideation. Patient did not endorse homicide ideation.     Patient is not taking scheduled medications.      Attending Interval History:  Medically clear for discharge.    C/L Psychiatry initial treatment plan (9/20):  Safety/Supervision/Disposition:  Legal Status: 72 hour holds --> Discontinue 72 hour holds, consents to voluntary mental health admission.  Holdable, if demands discharge prior to safe placement.  Precautions placed:   Discontinue 1:1 for safety.  Contracts for safety while hospitalized.  Plan discussed with direct care RN/Sitter.  Discussed alternative plan to continue 1:1 for medical purposes.  Placement:  Inpatient Psychiatry, once medically cleared.     Medication Recommendations:   PTA Psychiatric Medications reviewed:  NONE  Discussed, reviewed psychiatric medication management for depression, anxiety, mood.  Declines medication started at this time, agrees to consider when admitted.  Hydroxyzine: PRN hydroxyzine 50 mg every 6 hours, anxiety/sleep    Care coordination performed.  Treatment plan discussed in detail with patient's mother, Dayna; consent given.  Mother accepts " "plan to proceed with outpatient mental health referrals as discussed in detail, rather than inpatient psychiatry.  Patient will stay with grandparents immediately after discharge.    Review of notes and/or information:  I personally reviewed notes from the patient's electronic chart since initial psychiatry consult dated 9/20, most recent visit, and other interim reports. This provided me with information regarding patient's recent clinical course.     I personally reviewed the patient's chart, including available medication list and progression of hospital course.       OBJECTIVE   Upon interview, the patient is cooperative on approach.  Visit performed in patient's room on the unit.  Consent is to evaluate.  Patient is voluntary.    Suicidal ideation: denies current or recent suicidal ideation or behaviors.    Homicidal ideation: denies current or recent homicidal ideation or behaviors.    Psychotic symptoms:  Patient denies AH, VH, paranoia, delusions.     Initially, did not accept psychiatric medications to target depression, anxiety.  Today, accepting of medication trial to target signs and symptoms of depression.  Education provided in detail to start Lexapro trial.   Discussed events leading to admission.  Describes having a, \"bad day,\" leading to suicidal behaviors.  Expresses remorse of suicidal actions.  Future-oriented statements for family, work.  Denies gun access.  Able to state an appropriate crisis relapse plan.    Treatment plan discussed in detail.  Discussed inpatient versus outpatient programming for least restrictive placement.  Consent given to speak with mother.  Patient reports plan to stay with family to provide supports after discharge.  Patient accepting of referrals to [facility], psychiatry and therapist.  Referrals were given.  Mother accepting of plan.    Acute medical concerns: none.  Understands the need to remove sutures between 9/27 and 9/30.    Other issues reported by patient:  Patient " had no further questions or concerns.         MEDICATIONS   Medications:  Scheduled Meds:  Current Facility-Administered Medications   Medication Dose Route Frequency Provider Last Rate Last Admin    sodium chloride (PF) 0.9% PF flush 3 mL  3 mL Intracatheter Q8H Ck Lomas MD   3 mL at 09/21/24 1045     Continuous Infusions:  Current Facility-Administered Medications   Medication Dose Route Frequency Provider Last Rate Last Admin     PRN Meds:.  Current Facility-Administered Medications   Medication Dose Route Frequency Provider Last Rate Last Admin    albuterol (PROVENTIL HFA/VENTOLIN HFA) inhaler  1-2 puff Inhalation Q6H PRN Stephen Rapp MD        artificial tears (GENTEAL) 0.1-0.2-0.3 % ophthalmic solution 1 drop  1 drop Both Eyes BID PRN Ck Lomas MD   1 drop at 09/21/24 1414    calcium carbonate (TUMS) chewable tablet 1,000 mg  1,000 mg Oral 4x Daily PRN Ck Lomas MD   1,000 mg at 09/21/24 1044    hydrOXYzine HCl (ATARAX) tablet 50 mg  50 mg Oral Q6H PRN Crispin Manrique MD        ibuprofen (ADVIL/MOTRIN) tablet 600 mg  600 mg Oral Q6H PRN Ck Lomas MD        lidocaine (LMX4) cream   Topical Q1H PRN Ck Lomas MD        lidocaine 1 % 0.1-1 mL  0.1-1 mL Other Q1H PRN Ck Lomas MD        loperamide (IMODIUM) capsule 2 mg  2 mg Oral 4x Daily PRN Gunner Nails MD   2 mg at 09/20/24 1449    ondansetron (ZOFRAN ODT) ODT tab 4 mg  4 mg Oral Q6H PRN Ck Lomas MD        Or    ondansetron (ZOFRAN) injection 4 mg  4 mg Intravenous Q6H PRN Ck Lomas MD        senna-docusate (SENOKOT-S/PERICOLACE) 8.6-50 MG per tablet 1 tablet  1 tablet Oral BID PRN Ck Lomas MD        Or    senna-docusate (SENOKOT-S/PERICOLACE) 8.6-50 MG per tablet 2 tablet  2 tablet Oral BID PRN Ck Lomas MD        sodium chloride (PF) 0.9% PF flush 3 mL  3 mL Intracatheter q1 min prn  "Ck Lomas MD   3 mL at 09/22/24 0858          ROS   The 10 point Review of Systems is negative other than noted in the HPI or here.       MENTAL STATUS EXAM   Vitals: /71 (BP Location: Left arm)   Pulse 65   Temp 97.9  F (36.6  C) (Oral)   Resp 18   Ht 1.676 m (5' 6\")   Wt 73.9 kg (163 lb)   SpO2 97%   BMI 26.31 kg/m    Weight:   163 lbs 0 oz    Body mass index is 26.31 kg/m .  Vitals:    09/20/24 0223 09/21/24 0625 09/22/24 0645   Weight: 72.6 kg (160 lb) 73.6 kg (162 lb 3.2 oz) 73.9 kg (163 lb)     Appearance:  Clean/neat  Mood:  \"Better\"  Affect: mood congruent, stable  Suicidal Ideation: absent  Homicidal Ideation: absent  Thought process: normal  Thought content: Normal  Fund of Knowledge: Sufficient  Attention/Concentration: intact  Language ability: intact  Memory: recent and remote memory intact  Insight and Judgement: age appropriate  Orientation: person, place, time and situation  Psychomotor Behavior: Normal  Muscle Strength and Tone: normal  Gait and Station: normal gait and station       LABS   personally reviewed.   Temp: 97.9  F (36.6  C) Temp src: Oral BP: 113/71 Pulse: 65   Resp: 18 SpO2: 97 % O2 Device: None (Room air)   Height: 167.6 cm (5' 6\") Weight: 73.9 kg (163 lb)  Estimated body mass index is 26.31 kg/m  as calculated from the following:    Height as of this encounter: 1.676 m (5' 6\").    Weight as of this encounter: 73.9 kg (163 lb).    Recent Results (from the past 48 hour(s))   Comprehensive metabolic panel    Collection Time: 09/21/24 12:57 PM   Result Value Ref Range    Sodium 140 135 - 145 mmol/L    Potassium 3.7 3.4 - 5.3 mmol/L    Carbon Dioxide (CO2) 32 (H) 22 - 29 mmol/L    Anion Gap 5 (L) 7 - 15 mmol/L    Urea Nitrogen 12.0 6.0 - 20.0 mg/dL    Creatinine 1.09 0.67 - 1.17 mg/dL    GFR Estimate >90 >60 mL/min/1.73m2    Calcium 8.9 8.8 - 10.4 mg/dL    Chloride 103 98 - 107 mmol/L    Glucose 114 (H) 70 - 99 mg/dL    Alkaline Phosphatase 65 40 - 150 U/L    " AST 24 0 - 45 U/L    ALT 27 0 - 70 U/L    Protein Total 5.8 (L) 6.4 - 8.3 g/dL    Albumin 3.9 3.5 - 5.2 g/dL    Bilirubin Total 0.5 <=1.2 mg/dL   Acetaminophen level    Collection Time: 09/21/24 12:57 PM   Result Value Ref Range    Acetaminophen <5.0 (L) 10.0 - 30.0 ug/mL   INR    Collection Time: 09/21/24 12:57 PM   Result Value Ref Range    INR 1.22 (H) 0.85 - 1.15   Comprehensive metabolic panel    Collection Time: 09/22/24  8:28 AM   Result Value Ref Range    Sodium 142 135 - 145 mmol/L    Potassium 3.9 3.4 - 5.3 mmol/L    Carbon Dioxide (CO2) 30 (H) 22 - 29 mmol/L    Anion Gap 8 7 - 15 mmol/L    Urea Nitrogen 10.8 6.0 - 20.0 mg/dL    Creatinine 1.17 0.67 - 1.17 mg/dL    GFR Estimate 88 >60 mL/min/1.73m2    Calcium 9.0 8.8 - 10.4 mg/dL    Chloride 104 98 - 107 mmol/L    Glucose 101 (H) 70 - 99 mg/dL    Alkaline Phosphatase 63 40 - 150 U/L    AST 40 0 - 45 U/L    ALT 39 0 - 70 U/L    Protein Total 6.0 (L) 6.4 - 8.3 g/dL    Albumin 4.1 3.5 - 5.2 g/dL    Bilirubin Total 0.3 <=1.2 mg/dL   INR    Collection Time: 09/22/24  8:28 AM   Result Value Ref Range    INR 1.13 0.85 - 1.15   Bilirubin direct    Collection Time: 09/22/24  8:28 AM   Result Value Ref Range    Bilirubin Direct <0.20 0.00 - 0.30 mg/dL   CBC with platelets and differential    Collection Time: 09/22/24  8:28 AM   Result Value Ref Range    WBC Count 7.0 4.0 - 11.0 10e3/uL    RBC Count 4.31 (L) 4.40 - 5.90 10e6/uL    Hemoglobin 13.2 (L) 13.3 - 17.7 g/dL    Hematocrit 39.4 (L) 40.0 - 53.0 %    MCV 91 78 - 100 fL    MCH 30.6 26.5 - 33.0 pg    MCHC 33.5 31.5 - 36.5 g/dL    RDW 12.0 10.0 - 15.0 %    Platelet Count 244 150 - 450 10e3/uL    % Neutrophils 66 %    % Lymphocytes 23 %    % Monocytes 7 %    % Eosinophils 4 %    % Basophils 0 %    % Immature Granulocytes 0 %    NRBCs per 100 WBC 0 <1 /100    Absolute Neutrophils 4.6 1.6 - 8.3 10e3/uL    Absolute Lymphocytes 1.6 0.8 - 5.3 10e3/uL    Absolute Monocytes 0.5 0.0 - 1.3 10e3/uL    Absolute Eosinophils  "0.3 0.0 - 0.7 10e3/uL    Absolute Basophils 0.0 0.0 - 0.2 10e3/uL    Absolute Immature Granulocytes 0.0 <=0.4 10e3/uL    Absolute NRBCs 0.0 10e3/uL     No results found for: \"PHENYTOIN\", \"PHENOBARB\", \"VALPROATE\", \"CBMZ\"       DIAGNOSIS   Principal Problem:    Major depressive disorder, recurrent episode, severe with mixed features    Active Problem List:  Patient Active Problem List   Diagnosis    Current moderate episode of major depressive disorder without prior episode (H)    Generalized anxiety disorder    Cannabis dependence (H)    Congenital ptosis of left eyelid    Congenital ptosis, right    Suicide attempt (H)    Deliberate self-cutting    Tylenol overdose, intentional self-harm, initial encounter (H)    Major depressive disorder, recurrent episode, severe with mixed features (H)          ASSESSMENT/PLAN   Today's Changes-09/23/2024:  Medication Changes:    Lexapro: Start trial Lexapro 5 mg daily x 1 week, then 10 mg daily for depression, anxiety.  Other:  Least restrictive placement indicated.  Risk assessment performed in detail and cares coordinated with mother.  Plan to discharge to family with outpatient mental health referrals to include PHP, psychiatry and therapist to coordinate a safe discharge plan, rather than inpatient psychiatry.  Education:  Risks, benefits, and alternatives discussed at length with patient and mother.    Disposition:  Discharge to family with outpatient mental health referrals PHP, psychiatry, and therapy:  Cares coordinated with mother directly who supports discharge plan.    Social:  Legal Status: voluntary  Care Coordination: Mother, staff  Discharge location: home with family and outpatient mental supports  Discharge Medications: ordered.  Follow-up Appointments: not scheduled, referrals given    Care Coordination:  Treatment plan discussed directly with staff and mother.  Psychiatry to SIGN OFF.          Risk Assessment: [facility] RISK ASSESSMENT: Patient able to contract " for safety    Coordination of Care:   Treatment Plan reviewed, Care discussed with Care/Treatment Team Members, Chart reviewed and Patient seen         Crispin Manrique MD    -     09/23/2024  -     9:28 AM    Total encounter time:  A total of  62  minutes spent related to chart review, history and exam, documentation   and further activities as noted above    This note was created with help of Dragon dictation system. Grammatical / typing errors are not intentional.        Crispin Manrique MD  Consult/Liaison Psychiatry   [facility]

## 2024-09-23 NOTE — PLAN OF CARE
Orientation: A&Ox4 - calm, cooperative   Aggression Stop Light: Green  Activity: Independent   Diet/BS Checks: Regular diet, safe tray   Tele:  N/a  IV Access/Drains: R PIV SL   Pain Management: Denies pain   Abnormal VS/Results: VSS on RA  Bowel/Bladder: Continent B/B  Skin/Wounds: Bilat wrist and ankle wounds with sutures - dressing CDI  Consults: Psych   D/C Disposition: Pending inpatient psych placement   Other Info:   -Denies suicidal thoughts   -Psych consult needed to proceed with outpatient psych intake process. RN to call psych intake [phone] once updates from psych posted

## 2024-09-23 NOTE — DISCHARGE SUMMARY
"[facility]  Hospitalist Discharge Summary      Date of Admission:  9/20/2024  Date of Discharge:  9/23/2024  Discharging Provider: Shyla Hurtado MD  Discharge Service: Hospitalist Service    Discharge Diagnoses   See below    Clinically Significant Risk Factors     # Overweight: Estimated body mass index is 26.31 kg/m  as calculated from the following:    Height as of this encounter: 1.676 m (5' 6\").    Weight as of this encounter: 73.9 kg (163 lb).       Follow-ups Needed After Discharge   Follow-up Appointments     Follow Up and recommended labs and tests      1. Follow-up with mental health providers.  2. Follow-up with primary care provider after mental health   hospitalization.            Unresulted Labs Ordered in the Past 30 Days of this Admission       No orders found from 8/21/2024 to 9/21/2024.        These results will be followed up by n/a    Discharge Disposition   Discharged to home  Condition at discharge: Stable    Hospital Course   29 year old male with history of increased depression/anxiety, who presented 9/20/2024 after a suicide attempt.     From H&P 9/20:  \"Pt states he's been planning suicide for 1.5 years, depression longer, \"not enjoying life\". Also mentions trauma that he didn't get into. Today, first tried CO by trying plugging tailpipe (didn't work, minimal CO exposure), next cut wrists and ankles. Then took 80 pills of unclear strength tylenol. Also drank 3/4 of 750 ml vodka. Some nausea/ vomiting. In ED AFVSS. AST/ALT normal, TB 1.4. tylenol level 77.7. WBC 13.7. EKG normal.\"         Problem Oriented Hospital Course:         Acetaminophen overdose  Depression/anxiety  Reported ADD  * Initial presentation as above. Case d/w poison control. Started on NAC protocol. Placed on 72h hold. WOC consult for wounds.  * Later on 9/20, follow-up acetaminophen level <5. NAC stopped/completed. 72h hold stopped as pt consented to voluntary  admission. Sitter " discontinued.  * On 9/21, early am, LFT's normal, INR 1.35. Acetaminophen level < 5.  -Seen in consultation by psychiatry and psychology  -Initiation of Lexapro which was prescribed at discharge  -Recommend outpatient close follow-up with mental health support.  Appointment is in 2 days on 9/25/2024.  -Final assessment by psychiatry on the date of discharge 9/23/2024, patient is safe for discharge with family and contracted for safety.     Upper and lower extremity lacerations, self inflicted.  * Initial presentation as above. Pt with self inflicted lacerations (five total) which were sutured in the ED: 10 sutures left forearm; 6 sutures right distal forearm; 7 sutures right proximal forearm; 2 sutures right leg; 8 sutures left leg.  - Continue local wound cares per WOC RN and ED instructions.  - Continue PRN ibuprofen.  - Suture removal 7-10 days from placement (~9/27-9/30) in primary care clinic.     Mild CHRISTELLE, suspect prerenal.  * Initial presentation as above. Cr 1.21 on admit. Started on IVF's on admit.  * Cr normalized and IVF's discontinued 9/21.     Leukocytosis, suspect reactive, resolved  * WBC 13.7 on admit. Pt afebrile, no clear signs of localized infection on admission.  Recent Labs   Lab 09/22/24  0828 09/20/24  0308   WBC 7.0 13.7*     Consultations This Hospital Stay   DIAGNOSTIC EVALUATION CENTER (DEC) ASSESSMENT ORDER  PSYCHIATRY IP CONSULT  WOUND OSTOMY CONTINENCE NURSE  IP CONSULT  CARE MANAGEMENT / SOCIAL WORK IP CONSULT  PSYCHIATRY IP CONSULT  DIAGNOSTIC EVALUATION CENTER (DEC) ASSESSMENT ORDER  WOUND OSTOMY CONTINENCE NURSE  IP CONSULT    Code Status   Full Code    Time Spent on this Encounter I, Shyla Hurtado MD, personally saw the patient today and spent greater than 30 minutes discharging this patient.       Shyla Hurtado MD  [place] [address] [facility]  [address]  [facility] [address]  Phone:  [phone]  ______________________________________________________________________    Physical Exam   Vital Signs: Temp: 97.9  F (36.6  C) Temp src: Oral BP: 113/71 Pulse: 65   Resp: 18 SpO2: 97 % O2 Device: None (Room air)    Weight: 163 lbs 0 oz         Primary Care Physician   Salome Duke    Discharge Orders      General info for SNF    Length of Stay Estimate: Short Term Care: Estimated # of Days <30  Condition at Discharge: Improving  Level of care:skilled   Rehabilitation Potential: Good  Admission H&P remains valid and up-to-date: Yes  Recent Chemotherapy: N/A  Use Nursing Home Standing Orders: Yes     Mantoux instructions    Give two-step Mantoux (PPD) Per Facility Policy Yes     Follow Up and recommended labs and tests    1. Follow-up with mental health providers.  2. Follow-up with primary care provider after mental health hospitalization.     Activity - Up with nursing assistance     Reason for your hospital stay    1. Acetaminophen overdose.  2. Upper and lower extremity lacerations, self inflicted.  3. Depression/anxiety with suicide attempt.  4. Mild CHRISTELLE, suspect prerenal.  5. Leukocytosis, suspect reactive.     Discharge Instructions    Suture removal from bilateral upper and lower extremities ~9/27-9/30/2024.     Diet    Follow this diet upon discharge: Orders Placed This Encounter      Combination Diet Regular Diet; Safe Tray - with utensils         Significant Results and Procedures   See epic    Discharge Medications   Current Discharge Medication List        START taking these medications    Details   escitalopram (LEXAPRO) 5 MG tablet Take 1 tablet (5 mg) by mouth daily for 7 days, THEN 2 tablets (10 mg) daily.  Qty: 75 tablet, Refills: 1    Associated Diagnoses: Major depressive disorder, recurrent episode, severe with mixed features; Generalized anxiety disorder      ibuprofen (ADVIL/MOTRIN) 600 MG tablet Take 1 tablet (600 mg) by mouth every 6 hours as needed for inflammatory pain.     Associated Diagnoses: Deliberate self-cutting           CONTINUE these medications which have NOT CHANGED    Details   albuterol (PROAIR HFA/PROVENTIL HFA/VENTOLIN HFA) 108 (90 Base) MCG/ACT inhaler Inhale 1-2 puffs into the lungs every 6 hours as needed      Oxymetazoline HCl (UPNEEQ) 0.1 % SOLN Apply 1 drop to eye as needed. Pt to use own.  Has 1 sample left from Ophthalmologist.      polyethylene glycol 400 (BLINK TEARS) 0.25 % SOLN ophthalmic solution Place 1 drop into both eyes 2 times daily as needed for dry eyes.           Allergies   Allergies   Allergen Reactions    Peanut (Diagnostic) Dermatitis     Causes burning of skin - immediate sores in mouth and on lips    Sulfa Antibiotics Dermatitis     Sulfa caused severe flares of eczema as an infant    Doxycycline Nausea and Vomiting    Menactra [Mening Acy&W-135 Diphth Conj] Rash    Trifluoperazine Swelling and Rash

## 2024-09-23 NOTE — PLAN OF CARE
Discharge Note    Patient discharged to home via private vehicle  accompanied by other mom .  IV: Discontinued  Prescriptions filled and given to patient/family.   Belongings reviewed and sent with patient.   Home medications returned to patient: NA  Equipment sent with: N/A.   patient and family verbalizes understanding of discharge instructions. AVS given to patient and family.

## 2024-09-25 ENCOUNTER — TELEPHONE (OUTPATIENT)
Dept: BEHAVIORAL HEALTH | Facility: CLINIC | Age: 29
End: 2024-09-25
Payer: COMMERCIAL

## 2024-09-25 ENCOUNTER — OFFICE VISIT (OUTPATIENT)
Dept: BEHAVIORAL HEALTH | Facility: CLINIC | Age: 29
End: 2024-09-25
Payer: COMMERCIAL

## 2024-09-25 DIAGNOSIS — F43.9 TRAUMA AND STRESSOR-RELATED DISORDER: Primary | ICD-10-CM

## 2024-09-25 PROCEDURE — 99207 PR NO CHARGE LOS: CPT

## 2024-09-25 ASSESSMENT — ANXIETY QUESTIONNAIRES
7. FEELING AFRAID AS IF SOMETHING AWFUL MIGHT HAPPEN: NOT AT ALL
2. NOT BEING ABLE TO STOP OR CONTROL WORRYING: NOT AT ALL
3. WORRYING TOO MUCH ABOUT DIFFERENT THINGS: SEVERAL DAYS
IF YOU CHECKED OFF ANY PROBLEMS ON THIS QUESTIONNAIRE, HOW DIFFICULT HAVE THESE PROBLEMS MADE IT FOR YOU TO DO YOUR WORK, TAKE CARE OF THINGS AT HOME, OR GET ALONG WITH OTHER PEOPLE: NOT DIFFICULT AT ALL
GAD7 TOTAL SCORE: 9
GAD7 TOTAL SCORE: 9
6. BECOMING EASILY ANNOYED OR IRRITABLE: NEARLY EVERY DAY
5. BEING SO RESTLESS THAT IT IS HARD TO SIT STILL: SEVERAL DAYS
4. TROUBLE RELAXING: MORE THAN HALF THE DAYS
1. FEELING NERVOUS, ANXIOUS, OR ON EDGE: MORE THAN HALF THE DAYS

## 2024-09-25 ASSESSMENT — PATIENT HEALTH QUESTIONNAIRE - PHQ9: SUM OF ALL RESPONSES TO PHQ QUESTIONS 1-9: 9

## 2024-09-25 NOTE — TELEPHONE ENCOUNTER
Received a message from therapist that patient is reporting pain from self inflicted laceration sites on his wrists.     Per chart review, patient was hospitalized 9/20/24 for a suicide attempt which included lacerations to bilateral wrists. Follow-up appointment on 9/27/2024 with PCP to remove stitches.     Patient states he got the incisions wet in the shower last night.  He has been putting a antibiotic cream and occlusive bandage and wrapping arm with gauze. He feels the left arm is more swollen and painful now and he is concerned about infection and scarring. He is also concerned that the left site feel warmer.     Patient has not been taking ibuprofen.     Today the bandages are falling off. He is willing to allow assistance in re application of dressings to left wrist laceration and 2 right wrist lacerations.     Stitches on all wrist sites are intact and wound edges are well approximated. No visible redness. Some swelling noted on left wrist laceration which patient reports was there upon discharge but appears more swollen now. Right wrist lacerations x2 with scant yellow drainage. Left site is warm but seems in similarity to rest of his arm.     Patient requests additional moisture to sites than just non adherent dressing. Supplies obtained from LTACH unit.     Supplies use:    - Normal saline to rinse site  - sterile gauze to remove excess saline  - vasaline gauze   - non adherent dressing  - gauze wrap around wrists    RN instructed patient to contact his PCP clinic through [facility] - Dr. Duke and give an update on his concerns regarding the site. RN suggested he take pictures of the site and include them on the [employer] portal for provider. He agreed to do this and is aware of his 9/27/24 appointment to have stiches removed. RN encouraged ibuprofen for pain and swelling and cool pack to skin with a barrier and no longer than 20 minutes on (20 minutes off/break).    Belgica Caldwell RN on 9/25/2024  at 10:44 AM    [facility]

## 2024-09-25 NOTE — PROGRESS NOTES
"    Transition Clinic - Initial Visit Progress Note    Patient  Name: Chuckie Manzano, : 1995    Date:  2024       Service Type:  Mental Health Initial Visit       VISIT TIME START: 835  VISIT TIME END: 945     Session Length:   TC Session Length: 60 (53+ Minutes)  70 minutes    Visit #: 1    Attendees:  TC Attendees: Client alone    Service Modality:  Service Modality: In-Person    Source of Referral:  Triage Transition      Informed Consent and Assessment Methods    This provider and patient discussed HIPAA, and the limits of confidentiality; including mandated reporting, the possibility of collaborative discussions with patient's primary care provider and the multidisciplinary team in the MH clinic during consultation.  Discussed the no show policy, and the benefits and possible unintended consequences of therapy.  Patient indicated understanding Transition Clinic services are short term, solution focused, until a referral can be made and patient can bridge to long term therapy.  Patient verbally indicated understanding the informed consent.         Presenting Concerns/  Current Stressors:  Chuckie Manzano is a 29 year old identified male who was referred to the Transition Clinic by triage and transition for eval/assess, bridging care.  Chuckie Manzano reports suicide attempt on Thursday, saw the light twice, gave up, found a house with lights and got the care he needed in the ED.    Pt reported he knew he was done in 2023 and has been planning suicide since then. He reported he began completing bucket list items, taking multiple trips, spending money not worrying about ramifications.     In  (age 22), pt wad in college, experienced break up, and reported he began isolating, trying to evaluate values, and assessing next phase of life. PT reported he has \"Trauma\" past and began to develop depression at this time, \"smoking a lot of week, replaying my life story.\" Pt completed college and " "reported he has a degree in human resource development, got a good job, had a resurgence in confidence in 2021. Pt reported he continued smoking week throughout this time and feels that THC had negative effect; \"I think the weed really effected my quality of life\" and he became depressed and hopeless again. Pt did not divulge details about his past trauma but indicated it may be a contributing factor to his recurrent depressive sxs. Currently, pt expressed desire to remain alive and acknowledges belief he has purpose in his new life. Pt is in agreement to return to continue processing recent events and create plan for future MH support. Follow up 10/02    ASSESSMENT:  Required Screenings: The following assessments were completed by patient for this visit:  PHQ9:       9/25/2024     9:00 AM   PHQ-9 SCORE   PHQ-9 Total Score 9     GAD7:       9/25/2024     9:00 AM   MIKKI-7 SCORE   Total Score 9     CAGE-AID:        No data to display              PROMIS 10-Global Health (all questions and answers displayed):       9/25/2024     9:00 AM   PROMIS 10   In general, would you say your health is: 3   In general, would you say your quality of life is: 2   In general, how would you rate your physical health? 5   In general, how would you rate your mental health, including your mood and your ability to think? 1   In general, how would you rate your satisfaction with your social activities and relationships? 4   In general, please rate how well you carry out your usual social activities and roles. (This includes activities at home, at work and in your community, and responsibilities as a parent, child, spouse, employee, friend, etc.) 4   To what extent are you able to carry out your everyday physical activities such as walking, climbing stairs, carrying groceries, or moving a chair? 5   In the past 7 days, how often have you been bothered by emotional problems such as feeling anxious, depressed, or irritable? 2   In the past 7 " days, how would you rate your fatigue on average? 4   In the past 7 days, how would you rate your pain on average, where 0 means no pain, and 10 means worst imaginable pain? 2   Global Mental Health Score 11   Global Physical Health Score 16   PROMIS TOTAL - SUBSCORES 27     [place] Suicide Severity Rating Scale (Lifetime/Recent)      7/22/2024     7:47 AM 9/20/2024     2:35 AM 9/23/2024    12:27 PM 9/26/2024    10:00 AM   [place] Suicide Severity Rating (Lifetime/Recent)   Q1 Wish to be Dead (Lifetime)  Yes     Q2 Non-Specific Active Suicidal Thoughts (Lifetime)  Yes     Q1 Wished to be Dead (Past Month) 0-->no 1-->yes 1-->yes    Q2 Suicidal Thoughts (Past Month) 0-->no 1-->yes 1-->yes    Q3 Suicidal Thought Method  1-->yes 1-->yes    Q4 Suicidal Intent without Specific Plan   1-->yes    Q5 Suicide Intent with Specific Plan  1-->yes 1-->yes    Q6 Suicide Behavior (Lifetime) 0-->no 1-->yes     If yes to Q6, within past 3 months?  1-->yes 1-->yes    Level of Risk per Screen no risks indicated high risk high risk    Q1 Wish to be Dead (Lifetime)    Y   1. Wish to be Dead (Past 1 Month)    Y   Q2 Non-Specific Active Suicidal Thoughts (Lifetime)    Y   2. Non-Specific Active Suicidal Thoughts (Past 1 Month)    Y   3. Active Suicidal Ideation with any Methods (Not Plan) Without Intent to Act (Lifetime)  Y  Y   Q3 Active Suicidal Ideation with any Methods (Not Plan) Without Intent to Act (Past 1 Month)    Y   Q4 Active Suicidal Ideation with Some Intent to Act, Without Specific Plan (Lifetime)  Y  Y   4. Active Suicidal Ideation with Some Intent to Act, Without Specific Plan (Past 1 Month)    Y   Q5 Active Suicidal Ideation with Specific Plan and Intent (Lifetime)  Y  Y   5. Active Suicidal Ideation with Specific Plan and Intent (Past 1 Month)    Y   Most Severe Ideation Rating (Past 1 Month)   5    Frequency (Past 1 Month)   4    Duration (Past 1 Month)   3    Controllability (Past 1 Month)   0    Deterrents (Past 1  Month)   5    Reasons for Ideation (Past 1 Month)   5    Actual Attempt (Lifetime)  Y  Y   Total Number of Actual Attempts (Lifetime)  1  1   Actual Attempt Description (Lifetime)  attempt on 9/19/24- via, overdose, cutting, asphyxiation and drowning  Attempted with four different methods on 9/19/2024   Actual Attempt (Past 3 Months)   Y    Total Number of Actual Attempts (Past 3 Months)   1    Actual Attempt Description (Past 3 Months)   9/19/24 attempt with multiple means: carbon monoxide poisoning, cutting his wrists and ankles, intentional overdose    Has subject engaged in non-suicidal self-injurious behavior? (Lifetime)  N     Has subject engaged in non-suicidal self-injurious behavior? (Past 3 Months)   N    Interrupted Attempts (Lifetime)  N     Interrupted Attempts (Past 3 Months)   N    Aborted or Self-Interrupted Attempt (Lifetime)  Y     Total Number of Aborted or Self-Interrupted Attempts (Lifetime)  1     Aborted or Self-Interrupted Attempt (Past 3 Months)   Y    Total Number of Aborted or Self-Interrupted Attempts (Past 3 Months)   1    Preparatory Acts or Behavior (Lifetime)  Y     Total Number of Preparatory Acts (Lifetime)  1     Preparatory Acts or Behavior (Past 3 Months)   Y Y   Total Number of Preparatory Acts (Past 3 Months)   1 3   Preparatory Acts or Behavior Description (Past 3 Months)   writing camille left letters, sent packages, [employer] project as legacy,   Most Recent Attempt Date    9/19/2024   Actual Lethality/Medical Damage Code (Most Recent Attempt)    2   Potential Lethality Code (Most Recent Attempt)    2   Calculated C-SSRS Risk Score (Lifetime/Recent)  Moderate Risk High Risk High Risk       Mental Status Assessment:  Appearance:   Appropriate   Eye Contact:   Good   Psychomotor Behavior: Normal   Attitude:   Cooperative  Interested  Orientation:   All  Speech     Rate / Production:  Normal/ Responsive     Volume:   Normal   Mood:    Normal  Affect:    Appropriate   Thought  Content:  Clear   Thought Form:  Coherent  Goal Directed   Insight:    Fair       Safety Issues and Plan for Safety and Risk Management:  Patient denies current fears or concerns for personal safety.  Patient reports the following current or recent suicidal ideation or behaviors: Pt attempted suicide on 9/19.  Patient denies current or recent homicidal ideation or behaviors.  Patient denies current or recent self injurious behavior or ideation.  Patient denies other safety concerns.  A safety and risk management plan has been developed including: Patient consented to co-developed safety plan.  Safety and risk management plan was completed - see below.  Patient agreed to use safety plan should any safety concerns arise.  A copy was given to the patient.  Patient reports there are no firearms in the house.     Diagnostic Criteria:  Unspecified Trauma- and Stressor-Related Disorder, Symptoms characteristic of a trauma and stressor related disorder that cause clinically significant distress or impairment in social, occupational, or other important areas of functioning predominate but do not meet the full criteria for any of the disorders in the trauma and stressor related disorders diagnostic class.     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    Collateral Reports Completed:  ERIC Collateral: [facility] electronic medical records reviewed.    DATA:  Interactive Complexity: Yes, visit entailed Interactive Complexity evidenced by:  Crisis: No    PLAN: (Homework, other):  Provider will continue Diagnostic Assessment.  Initial Treatment will focus on: Depressed Mood - identify triggers supports and determine long-term care options  Adjustment Difficulties related to: being alive .  2.   Provider recommended the following referrals: TBD.    3.   Information in this assessment was obtained from the medical record and provided by patient who is a good historian.   4.   Follow up scheduled:  10/02/2024        Patient was given the following to do until next session:  spend time with his loved ones  5.  Anticipated Discharge: Anticipated Discharge Time: 1 - 3 Months   6. Is this the patient's last discharge: No      Procedure Code:  Psychotherapy (with patient) - 60 [CPT 01026]    AUGIE Garcia    Suicide Risk and Safety Concerns were assessed for. Patient meets the following risk assessment and triage: Patient has no change in safety concerns. Committed to safety and agreed to follow previously developed safety plan.

## 2024-09-26 PROBLEM — F43.9 TRAUMA AND STRESSOR-RELATED DISORDER: Status: ACTIVE | Noted: 2024-09-26

## 2024-09-26 ASSESSMENT — COLUMBIA-SUICIDE SEVERITY RATING SCALE - C-SSRS
LETHALITY/MEDICAL DAMAGE CODE MOST RECENT ACTUAL ATTEMPT: MODERATE PHYSICAL DAMAGE, MEDICAL ATTENTION NEEDED
4. HAVE YOU HAD THESE THOUGHTS AND HAD SOME INTENTION OF ACTING ON THEM?: YES
2. HAVE YOU ACTUALLY HAD ANY THOUGHTS OF KILLING YOURSELF?: YES
TOTAL  NUMBER OF PREPARATORY ACTS PAST 3 MONTHS: 3
1. HAVE YOU WISHED YOU WERE DEAD OR WISHED YOU COULD GO TO SLEEP AND NOT WAKE UP?: YES
2. HAVE YOU ACTUALLY HAD ANY THOUGHTS OF KILLING YOURSELF?: YES
MOST RECENT DATE: 67102
3. HAVE YOU BEEN THINKING ABOUT HOW YOU MIGHT KILL YOURSELF?: YES
6. HAVE YOU EVER DONE ANYTHING, STARTED TO DO ANYTHING, OR PREPARED TO DO ANYTHING TO END YOUR LIFE?: YES
1. IN THE PAST MONTH, HAVE YOU WISHED YOU WERE DEAD OR WISHED YOU COULD GO TO SLEEP AND NOT WAKE UP?: YES
4. HAVE YOU HAD THESE THOUGHTS AND HAD SOME INTENTION OF ACTING ON THEM?: YES
TOTAL  NUMBER OF ACTUAL ATTEMPTS LIFETIME: 1
5. HAVE YOU STARTED TO WORK OUT OR WORKED OUT THE DETAILS OF HOW TO KILL YOURSELF? DO YOU INTEND TO CARRY OUT THIS PLAN?: YES
5. HAVE YOU STARTED TO WORK OUT OR WORKED OUT THE DETAILS OF HOW TO KILL YOURSELF? DO YOU INTEND TO CARRY OUT THIS PLAN?: YES
LETHALITY/MEDICAL DAMAGE CODE MOST RECENT POTENTIAL ATTEMPT: BEHAVIOR LIKELY TO RESULT IN DEATH DESPITE AVAILABLE MEDICAL CARE
ATTEMPT LIFETIME: YES

## 2024-10-01 ENCOUNTER — TELEPHONE (OUTPATIENT)
Dept: BEHAVIORAL HEALTH | Facility: CLINIC | Age: 29
End: 2024-10-01

## 2024-10-02 ENCOUNTER — OFFICE VISIT (OUTPATIENT)
Dept: BEHAVIORAL HEALTH | Facility: CLINIC | Age: 29
End: 2024-10-02
Payer: COMMERCIAL

## 2024-10-02 DIAGNOSIS — F43.9 TRAUMA AND STRESSOR-RELATED DISORDER: Primary | ICD-10-CM

## 2024-10-02 PROCEDURE — 99207 PR NO CHARGE LOS: CPT

## 2024-10-03 ASSESSMENT — COLUMBIA-SUICIDE SEVERITY RATING SCALE - C-SSRS
2. HAVE YOU ACTUALLY HAD ANY THOUGHTS OF KILLING YOURSELF?: NO
1. SINCE LAST CONTACT, HAVE YOU WISHED YOU WERE DEAD OR WISHED YOU COULD GO TO SLEEP AND NOT WAKE UP?: NO

## 2024-10-11 ENCOUNTER — HOSPITAL ENCOUNTER (OUTPATIENT)
Dept: BEHAVIORAL HEALTH | Facility: CLINIC | Age: 29
Discharge: HOME | End: 2024-10-11
Attending: PSYCHIATRY & NEUROLOGY | Admitting: PSYCHIATRY & NEUROLOGY
Payer: COMMERCIAL

## 2024-10-11 ENCOUNTER — HOSPITAL ENCOUNTER (EMERGENCY)
Facility: CLINIC | Age: 29
Discharge: HOME | End: 2024-10-11
Attending: FAMILY MEDICINE | Admitting: FAMILY MEDICINE
Payer: COMMERCIAL

## 2024-10-11 ENCOUNTER — APPOINTMENT (OUTPATIENT)
Dept: ULTRASOUND IMAGING | Facility: CLINIC | Age: 29
End: 2024-10-11
Attending: FAMILY MEDICINE
Payer: COMMERCIAL

## 2024-10-11 VITALS
SYSTOLIC BLOOD PRESSURE: 136 MMHG | DIASTOLIC BLOOD PRESSURE: 81 MMHG | TEMPERATURE: 97.7 F | WEIGHT: 168 LBS | HEART RATE: 74 BPM | BODY MASS INDEX: 27.12 KG/M2 | RESPIRATION RATE: 16 BRPM | OXYGEN SATURATION: 98 %

## 2024-10-11 DIAGNOSIS — M79.631 PAIN OF RIGHT FOREARM: ICD-10-CM

## 2024-10-11 PROCEDURE — 90791 PSYCH DIAGNOSTIC EVALUATION: CPT

## 2024-10-11 PROCEDURE — 99284 EMERGENCY DEPT VISIT MOD MDM: CPT | Mod: 25 | Performed by: FAMILY MEDICINE

## 2024-10-11 PROCEDURE — 93971 EXTREMITY STUDY: CPT | Mod: RT

## 2024-10-11 PROCEDURE — 99284 EMERGENCY DEPT VISIT MOD MDM: CPT | Performed by: FAMILY MEDICINE

## 2024-10-11 RX ORDER — GABAPENTIN 100 MG/1
100 CAPSULE ORAL 3 TIMES DAILY
Qty: 30 CAPSULE | Refills: 0 | Status: SHIPPED | OUTPATIENT
Start: 2024-10-11

## 2024-10-11 ASSESSMENT — ANXIETY QUESTIONNAIRES
IF YOU CHECKED OFF ANY PROBLEMS ON THIS QUESTIONNAIRE, HOW DIFFICULT HAVE THESE PROBLEMS MADE IT FOR YOU TO DO YOUR WORK, TAKE CARE OF THINGS AT HOME, OR GET ALONG WITH OTHER PEOPLE: SOMEWHAT DIFFICULT
GAD7 TOTAL SCORE: 4
6. BECOMING EASILY ANNOYED OR IRRITABLE: MORE THAN HALF THE DAYS
2. NOT BEING ABLE TO STOP OR CONTROL WORRYING: SEVERAL DAYS
GAD7 TOTAL SCORE: 4
8. IF YOU CHECKED OFF ANY PROBLEMS, HOW DIFFICULT HAVE THESE MADE IT FOR YOU TO DO YOUR WORK, TAKE CARE OF THINGS AT HOME, OR GET ALONG WITH OTHER PEOPLE?: SOMEWHAT DIFFICULT
1. FEELING NERVOUS, ANXIOUS, OR ON EDGE: NOT AT ALL
7. FEELING AFRAID AS IF SOMETHING AWFUL MIGHT HAPPEN: NOT AT ALL
4. TROUBLE RELAXING: NOT AT ALL
GAD7 TOTAL SCORE: 4
3. WORRYING TOO MUCH ABOUT DIFFERENT THINGS: SEVERAL DAYS
5. BEING SO RESTLESS THAT IT IS HARD TO SIT STILL: NOT AT ALL
7. FEELING AFRAID AS IF SOMETHING AWFUL MIGHT HAPPEN: NOT AT ALL

## 2024-10-11 ASSESSMENT — ACTIVITIES OF DAILY LIVING (ADL)
ADLS_ACUITY_SCORE: 35

## 2024-10-11 ASSESSMENT — PATIENT HEALTH QUESTIONNAIRE - PHQ9
SUM OF ALL RESPONSES TO PHQ QUESTIONS 1-9: 5
SUM OF ALL RESPONSES TO PHQ QUESTIONS 1-9: 5
10. IF YOU CHECKED OFF ANY PROBLEMS, HOW DIFFICULT HAVE THESE PROBLEMS MADE IT FOR YOU TO DO YOUR WORK, TAKE CARE OF THINGS AT HOME, OR GET ALONG WITH OTHER PEOPLE: SOMEWHAT DIFFICULT

## 2024-10-11 ASSESSMENT — COLUMBIA-SUICIDE SEVERITY RATING SCALE - C-SSRS
6. HAVE YOU EVER DONE ANYTHING, STARTED TO DO ANYTHING, OR PREPARED TO DO ANYTHING TO END YOUR LIFE?: YES
1. IN THE PAST MONTH, HAVE YOU WISHED YOU WERE DEAD OR WISHED YOU COULD GO TO SLEEP AND NOT WAKE UP?: NO
2. HAVE YOU ACTUALLY HAD ANY THOUGHTS OF KILLING YOURSELF IN THE PAST MONTH?: NO

## 2024-10-11 ASSESSMENT — PAIN SCALES - GENERAL: PAINLEVEL: MODERATE PAIN (4)

## 2024-10-11 NOTE — DISCHARGE INSTRUCTIONS
Discharge to home continue with your follow-up plan.  Use 600 to 800 mg of ibuprofen 3 times a day for the next 5 days.  If continued pain start gabapentin 100 mg 2-3 times per day.  Follow-up with your primary MD.

## 2024-10-11 NOTE — ED PROVIDER NOTES
[facility] [place] EMERGENCY DEPARTMENT ([facility])    10/11/24      ED PROVIDER NOTE   ED 12  History     Chief Complaint   Patient presents with    Arm Pain     RUE pain starting this AM at 5am.      HPI  Chuckie Manzano is a 29 year old male with recent history of hospitalization from 9/20-9/23/2024 for intentional Tylenol overdose with self-inflicted lacerations (including once to right distal forearm, right proximal forearm) who presents with right upper extremity pain that started this morning.  He states that he was prescribed antibiotics for these lacerations.  Patient describes the pain as a deep aching pain primarily located in the forearm with some radicular component from the upper arm as well.  He is noted has recent history of suicide attempt with lacerations on the forearm not far from where his pain is described.  Patient is currently getting outpatient treatment for his psychiatric concerns and denies any thoughts of harming himself at this time.    Past Medical History  Past Medical History:   Diagnosis Date    Asthma     Eczema      Past Surgical History:   Procedure Laterality Date    REPAIR PTOSIS BILATERAL Bilateral 7/22/2024    Procedure: Bilateral upper eyelid ptosis repair;  Surgeon: Ina Moura MD;  Location: [facility] OR     gabapentin (NEURONTIN) 100 MG capsule  albuterol (PROAIR HFA/PROVENTIL HFA/VENTOLIN HFA) 108 (90 Base) MCG/ACT inhaler  escitalopram (LEXAPRO) 5 MG tablet  ibuprofen (ADVIL/MOTRIN) 600 MG tablet  Oxymetazoline HCl (UPNEEQ) 0.1 % SOLN  polyethylene glycol 400 (BLINK TEARS) 0.25 % SOLN ophthalmic solution      Allergies   Allergen Reactions    Peanut (Diagnostic) Dermatitis     Causes burning of skin - immediate sores in mouth and on lips    Sulfa Antibiotics Dermatitis     Sulfa caused severe flares of eczema as an infant    Doxycycline Nausea and Vomiting    Menactra [Mening Acy&W-135 Diphth Conj] Rash    Trifluoperazine Swelling and Rash     Family History  Family  History   Problem Relation Age of Onset    Depression Mother     Anxiety Disorder Mother     Depression Maternal Grandfather     Glaucoma No family hx of     Macular Degeneration No family hx of      Social History   Social History     Tobacco Use    Smoking status: Never    Smokeless tobacco: Never      A medically appropriate review of systems was performed with pertinent positives and negatives noted in the HPI, and all other systems negative.    Physical Exam   BP: 136/81  Pulse: 74  Temp: 97.7  F (36.5  C)  Resp: 16  Weight: 76.2 kg (168 lb)  SpO2: 98 %  Physical Exam  Constitutional:       General: He is not in acute distress.     Appearance: Normal appearance. He is not toxic-appearing.   HENT:      Head: Atraumatic.   Eyes:      General: No scleral icterus.     Conjunctiva/sclera: Conjunctivae normal.   Cardiovascular:      Rate and Rhythm: Normal rate.      Heart sounds: Normal heart sounds.   Pulmonary:      Effort: Pulmonary effort is normal. No respiratory distress.      Breath sounds: Normal breath sounds.   Abdominal:      Palpations: Abdomen is soft.      Tenderness: There is no abdominal tenderness.   Musculoskeletal:         General: No deformity.      Cervical back: Neck supple.   Skin:     General: Skin is warm.   Neurological:      General: No focal deficit present.      Mental Status: He is alert and oriented to person, place, and time.      Sensory: No sensory deficit.      Motor: No weakness.      Coordination: Coordination normal.   Psychiatric:         Speech: Speech normal.         Behavior: Behavior is cooperative.         Thought Content: Thought content does not include homicidal or suicidal ideation.           ED Course, Procedures, & Data      Procedures    Results for orders placed or performed during the hospital encounter of 10/11/24   US Upper Extremity Venous Duplex Right     Status: None    Narrative    ULTRASOUND RIGHT UPPER EXTREMITY VENOUS DUPLEX October 11, 2024 10:18  AM      HISTORY: Right arm pain.    COMPARISON: None.    TECHNIQUE: Color Doppler and spectral waveform analysis performed  throughout deep and superficial veins of the right upper chest cavity.    FINDINGS: The right internal jugular, visualized innominate,  subclavian, axillary, and brachial veins demonstrate normal blood  flow, compression (where applicable), and augmentation. Basilic and  cephalic veins are compressible. Radial and ulnar veins are  compressible. Contralateral left internal jugular vein is patent.  Images taken near area of laceration without significant finding.      Impression    IMPRESSION: Negative for deep vein thrombosis in the right upper  extremity.    DIONI CONROY MD         SYSTEM ID:  [id number]     Medications - No data to display  Labs Ordered and Resulted from Time of ED Arrival to Time of ED Departure - No data to display  US Upper Extremity Venous Duplex Right   Final Result   IMPRESSION: Negative for deep vein thrombosis in the right upper   extremity.      DIONI CONROY MD            SYSTEM ID:  [id number]             Critical care was not performed.     Medical Decision Making  The patient's presentation was of moderate complexity (an acute illness with systemic symptoms).    The patient's evaluation involved:  ordering and/or review of 2 test(s) in this encounter (see separate area of note for details)    The patient's management necessitated moderate risk (prescription drug management including medications given in the ED).    Assessment & Plan        I have reviewed the nursing notes. I have reviewed the findings, diagnosis, plan and need for follow up with the patient.    Discharge Medication List as of 10/11/2024 11:05 AM        START taking these medications    Details   gabapentin (NEURONTIN) 100 MG capsule Take 1 capsule (100 mg) by mouth 3 times daily., Disp-30 capsule, R-0, Local Print             Final diagnoses:   Pain of right forearm       Ck Ta  MD [facility] EMERGENCY DEPARTMENT  10/11/2024     Ck Ta MD  10/11/24 1826

## 2024-10-11 NOTE — PROGRESS NOTES
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 Addiction Clinical Services  [address] [place] [address], [address] [address]   [email or web address]  Office: [phone] Fax: [phone]   October 11, 2024          "

## 2024-10-11 NOTE — ED TRIAGE NOTES
Pt reports old SIB scars to R forearm from 3 weeks ago and was prescribed abx. Pt denies new SIB.      Triage Assessment (Adult)       Row Name 10/11/24 0856          Triage Assessment    Airway WDL WDL        Respiratory WDL    Respiratory WDL WDL        Cardiac WDL    Cardiac WDL WDL        Peripheral/Neurovascular WDL    Peripheral Neurovascular WDL WDL        Cognitive/Neuro/Behavioral WDL    Cognitive/Neuro/Behavioral WDL WDL

## 2024-10-15 ENCOUNTER — TELEPHONE (OUTPATIENT)
Dept: BEHAVIORAL HEALTH | Facility: CLINIC | Age: 29
End: 2024-10-15
Payer: COMMERCIAL

## 2024-10-15 NOTE — PROGRESS NOTES
Transition Clinic Progress Note    Patient Name:   Chuckie Manzano Date: October 11, 2024          Service Type: Individual        No data recorded 1505     No data recorded 1600     Session Length:  TC Session Length: 60 (53+ Minutes)    Session #:  2    Attendees: TC Attendees: Client alone    Service Modality:  Service Modality: In-Person    Informed Consent and Assessment Methods    This provider and patient discussed HIPAA, and the limits of confidentiality; including mandated reporting, the possibility of collaborative discussions with patient's primary care provider and the multidisciplinary team in the  clinic during consultation.  Discussed the no show policy, and the benefits and possible unintended consequences of therapy.  Patient indicated understanding Transition Clinic services are short term, solution focused, until a referral can be made and patient can bridge to long term therapy.  Patient verbally indicated understanding the informed consent.        DATA   Interactive Complexity: Yes, visit entailed Interactive Complexity evidenced by:complex situation, brevity of information  Crisis: No        Progress Since Last Session (Related to Symptoms / Goals / Homework):   Symptoms: Improving pt denied active SI, passive reflections about recent attempt  Homework: Completed in session      Episode of Care Goals: Achieved / completed to satisfaction - PRECONTEMPLATION (Not seeing need for change); Intervened by educating the patient about the effects of current behavior on health.  Evoked information about reasons to continue behavior, express concern / recommendations, and explored any change talk     Current / Ongoing Stressors and Concerns:   Writer continued collecting diagnostic data though unable to complete DA due to brevity of information and complexity of pts current situation. Pt is denying active SI, though does experience reflection and image reminders of his attempts. Pt endorsed  well-controlled without medication. EMG in 2015 normal, repeat given worsening.   "expected stress related to \"cleaning up the mess; I didn't expect to be around to have to do this.\" Pt discussed future plans, possibly moving to [place] for a change of environment and job offer from maternal aunt. Pt remains living with his grandparents, who he reported close relationship with.      Treatment Objective(s) Addressed in This Session:  Decrease thoughts that you'd be better off dead or of suicide / self-harm  Continue gathering diagnostic data     Intervention:   Solution Focused Brief Therapy   Motivational Interviewing - helping to find the motivation to make positive behavior changes. and Person-Centered Therapy - Actualizes potential and moves towards increased awareness, spontaneity, trust in self and inner directedness.    Assessments completed prior to visit:  The following assessments were completed by patient for this visit:  [place] Suicide Severity Rating Scale (Short Version)      7/22/2024     7:47 AM 9/20/2024     2:35 AM 9/23/2024    12:27 PM 10/3/2024     7:00 AM 10/11/2024     8:57 AM   [place] Suicide Severity Rating (Short Version)   Q1 Wished to be Dead (Past Month) 0-->no 1-->yes 1-->yes  0-->no   Q2 Suicidal Thoughts (Past Month) 0-->no 1-->yes 1-->yes  0-->no   Q3 Suicidal Thought Method  1-->yes 1-->yes     Q4 Suicidal Intent without Specific Plan   1-->yes     Q5 Suicide Intent with Specific Plan  1-->yes 1-->yes     Q6 Suicide Behavior (Lifetime) 0-->no 1-->yes   1-->yes   If yes to Q6, within past 3 months?  1-->yes 1-->yes  1-->yes   Level of Risk per Screen no risks indicated high risk high risk  high risk   1. Wish to be Dead (Since Last Contact)    N    2. Non-Specific Active Suicidal Thoughts (Since Last Contact)    N    Calculated C-SSRS Risk Score (Since Last Contact)    No Risk Indicated          ASSESSMENT: Current Emotional / Mental Status (status of significant symptoms):   Risk status (Self / Other harm or suicidal ideation)   Patient denies current fears " or concerns for personal safety.   Patient reports the following current or recent suicidal ideation or behaviors: pt had suicide attempts three weeks ago.   Patient denies current or recent homicidal ideation or behaviors.   Patient denies current or recent self injurious behavior or ideation.   Patient denies other safety concerns.   Patient reports there has been no change in risk factors since their last session.     Patient reports there has been no change in protective factors since their last session.     Recommended that patient call 911 or go to the local ED should there be a change in any of these risk factors.     Appearance:   Appropriate    Eye Contact:   Fair    Psychomotor Behavior: Normal    Attitude:   Cooperative    Orientation:   All   Speech    Rate / Production: Normal     Volume:  Normal    Mood:    Normal   Affect:    Appropriate    Thought Content:  Clear  Rumination    Thought Form:  Coherent  Logical    Insight:    Fair      Medication Review:   No current psychiatric medications prescribed     Medication Compliance:   NA     Changes in Health Issues:   None reported     Chemical Use Review:   Substance Use: Chemical use reviewed, no active concerns identified      Tobacco Use: No current tobacco use.      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    PLAN: (Patient Tasks / Therapist Tasks / Other)  -RTC in one week  -Complete DA - if needed prior to next LOC    Is this the patient's last discharge?: No    Procedure Code: Psychotherapy (with patient) - 60 [CPT 56737]      Keila Perla MA, [facility]  October 11, 2024

## 2024-10-15 NOTE — TELEPHONE ENCOUNTER
Pt reminder call. Provider requested to have pt come for 10/16 appointment 2-4pm to complete diagnostic assessment.  left with information.    Jammie Salazar  10/15/2024  144

## 2024-10-15 NOTE — PROGRESS NOTES
"    Transition Clinic Progress Note    Patient Name:   Chuckie Manzano Date: October 15, 2024          Service Type: Individual        VISIT TIME START: 1505      VISIT TIME END: 1605      Session Length:  TC Session Length: 60 (53+ Minutes)    Session #:  3    Attendees: TC Attendees: Client alone    Service Modality:  Service Modality: In-Person    Informed Consent and Assessment Methods    This provider and patient discussed HIPAA, and the limits of confidentiality; including mandated reporting, the possibility of collaborative discussions with patient's primary care provider and the multidisciplinary team in the  clinic during consultation.  Discussed the no show policy, and the benefits and possible unintended consequences of therapy.  Patient indicated understanding Transition Clinic services are short term, solution focused, until a referral can be made and patient can bridge to long term therapy.  Patient verbally indicated understanding the informed consent.        DATA  Interactive Complexity: No  Crisis: No        Progress Since Last Session (Related to Symptoms / Goals / Homework):   Symptoms:  improving. Pt reported feeling \"optimistic\"  Homework: Achieved / completed to satisfaction      Episode of Care Goals: Achieved / completed to satisfaction - PREPARATION (Decided to change - considering how); Intervened by negotiating a change plan and determining options / strategies for behavior change, identifying triggers, exploring social supports, and working towards setting a date to begin behavior change     Current / Ongoing Stressors and Concerns:   Pt completed DA through assessment center on 10/11/2024. Today pt and writer reviewed assessment experience, referrals made, and pt's future goals. Completed tx plan. \"I'm super positive and super optimistic\" but things are bad..and good. Pt further described current stressors related to his SA - condo clean-up and related litigation, bankruptcy filing, " future planning. Pt reported two new job opportunities he's excited about and upcoming travel to see his aunt in [place]. Pt denied recent SI, plan or intent today.    No LSD since 2020. Did shrooms once in June 2024 - and felt the resurgence of anal sensations  March 2023 smoked weed and had the sensation return. Pt believes that using psychedelics seems to activate the anal sensation, which then seems to remain activated for weeks afterwards. Writer reached out to contact from [place] regarding hallucinogenic detox per pt request.      Treatment Objective(s) Addressed in This Session:   use cognitive strategies identified in therapy to challenge anxious thoughts  Identify short, mid, and long-term goals     Intervention:   Solution Focused Brief Therapy   Motivational Interviewing - helping to find the motivation to make positive behavior changes., Person-Centered Therapy - Actualizes potential and moves towards increased awareness, spontaneity, trust in self and inner directedness., and Psychodynamic - helping clients understand their emotions and unconscious patterns of behavior.    Assessments completed prior to visit:  The following assessments were completed by patient for this visit:  [place] Suicide Severity Rating Scale (Short Version)      7/22/2024     7:47 AM 9/20/2024     2:35 AM 9/23/2024    12:27 PM 10/3/2024     7:00 AM 10/11/2024     8:57 AM   [place] Suicide Severity Rating (Short Version)   Q1 Wished to be Dead (Past Month) 0-->no 1-->yes 1-->yes  0-->no   Q2 Suicidal Thoughts (Past Month) 0-->no 1-->yes 1-->yes  0-->no   Q3 Suicidal Thought Method  1-->yes 1-->yes     Q4 Suicidal Intent without Specific Plan   1-->yes     Q5 Suicide Intent with Specific Plan  1-->yes 1-->yes     Q6 Suicide Behavior (Lifetime) 0-->no 1-->yes   1-->yes   If yes to Q6, within past 3 months?  1-->yes 1-->yes  1-->yes   Level of Risk per Screen no risks indicated high risk high risk  high risk   1. Wish to be Dead  (Since Last Contact)    N    2. Non-Specific Active Suicidal Thoughts (Since Last Contact)    N    Calculated C-SSRS Risk Score (Since Last Contact)    No Risk Indicated          ASSESSMENT: Current Emotional / Mental Status (status of significant symptoms):   Risk status (Self / Other harm or suicidal ideation)   Patient denies current fears or concerns for personal safety.   Patient reports the following current or recent suicidal ideation or behaviors: Pt had suicide attempt last month.   Patient denies current or recent homicidal ideation or behaviors.   Patient denies current or recent self injurious behavior or ideation.   Patient denies other safety concerns.   Patient reports there has been no change in risk factors since their last session.     Patient reports there has been no change in protective factors since their last session.     A safety and risk management plan has been developed including: derek banks safety plan     Appearance:   Appropriate    Eye Contact:   Fair    Psychomotor Behavior: Normal    Attitude:   Cooperative    Orientation:   All   Speech    Rate / Production: Normal/ Responsive Normal     Volume:  Normal    Mood:    Normal Euthymic   Affect:    Appropriate    Thought Content:  Clear    Thought Form:  Coherent  Logical    Insight:    Good      Medication Review:   No changes to current psychiatric medication(s)     Medication Compliance:   Yes     Changes in Health Issues:   None reported     Chemical Use Review:   Substance Use: Chemical use reviewed, no active concerns identified      Tobacco Use: No current tobacco use.      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    PLAN: (Patient Tasks / Therapist Tasks / Other)  -RTC in 3 weeks    Is this the patient's last discharge?: No    Procedure Code: Psychotherapy (with patient) - 60 [CPT  80236]           Keila Perla MA, [facility]  October 15, 2024

## 2024-10-15 NOTE — TELEPHONE ENCOUNTER
Patient called TC asking for Keila to look into hallucinogenic detox and see if that is a legitimate thing and if anyone within her network know anything about it.    Coordinator will route to therapist for follow up.    Chana Andres  Transition Clinic Coordinator  10/15/24 3:53 PM

## 2024-10-16 ENCOUNTER — TELEPHONE (OUTPATIENT)
Dept: BEHAVIORAL HEALTH | Facility: CLINIC | Age: 29
End: 2024-10-16
Payer: COMMERCIAL

## 2024-10-16 ENCOUNTER — OFFICE VISIT (OUTPATIENT)
Dept: BEHAVIORAL HEALTH | Facility: CLINIC | Age: 29
End: 2024-10-16
Payer: COMMERCIAL

## 2024-10-16 DIAGNOSIS — F43.9 TRAUMA AND STRESSOR-RELATED DISORDER: Primary | ICD-10-CM

## 2024-10-16 PROCEDURE — 99207 PR NO CHARGE LOS: CPT

## 2024-10-16 ASSESSMENT — COLUMBIA-SUICIDE SEVERITY RATING SCALE - C-SSRS
TOTAL  NUMBER OF ABORTED OR SELF INTERRUPTED ATTEMPTS SINCE LAST CONTACT: NO
2. HAVE YOU ACTUALLY HAD ANY THOUGHTS OF KILLING YOURSELF?: NO
LETHALITY/MEDICAL DAMAGE CODE MOST LETHAL ACTUAL ATTEMPT: MODERATE PHYSICAL DAMAGE, MEDICAL ATTENTION NEEDED
6. HAVE YOU EVER DONE ANYTHING, STARTED TO DO ANYTHING, OR PREPARED TO DO ANYTHING TO END YOUR LIFE?: NO
TOTAL  NUMBER OF INTERRUPTED ATTEMPTS SINCE LAST CONTACT: NO
MOST LETHAL DATE: 67103
ATTEMPT SINCE LAST CONTACT: NO
SUICIDE, SINCE LAST CONTACT: NO
1. SINCE LAST CONTACT, HAVE YOU WISHED YOU WERE DEAD OR WISHED YOU COULD GO TO SLEEP AND NOT WAKE UP?: NO

## 2024-10-16 NOTE — TELEPHONE ENCOUNTER
Patient calls back regarding the VM left but no appointment change needed. Appointment for 3pm stayed the same for 10/16/2024.    Ailyn Degroot  Transition Clinic Coordinator  10/16/24 12:37 PM

## 2024-11-05 ENCOUNTER — TELEPHONE (OUTPATIENT)
Dept: BEHAVIORAL HEALTH | Facility: CLINIC | Age: 29
End: 2024-11-05
Payer: COMMERCIAL

## 2024-11-05 NOTE — TELEPHONE ENCOUNTER
Pt reminder call for 11/06 TC appointment. Appointment confirmed.    Jammie Salazar  11/05/2024  100

## 2024-11-06 ENCOUNTER — VIRTUAL VISIT (OUTPATIENT)
Dept: BEHAVIORAL HEALTH | Facility: CLINIC | Age: 29
End: 2024-11-06
Payer: COMMERCIAL

## 2024-11-06 DIAGNOSIS — F43.9 TRAUMA AND STRESSOR-RELATED DISORDER: Primary | ICD-10-CM

## 2024-11-06 PROCEDURE — 99207 PR NO CHARGE LOS: CPT | Mod: 95

## 2024-11-06 ASSESSMENT — COLUMBIA-SUICIDE SEVERITY RATING SCALE - C-SSRS
TOTAL  NUMBER OF ACTUAL ATTEMPTS LIFETIME: 1
6. HAVE YOU EVER DONE ANYTHING, STARTED TO DO ANYTHING, OR PREPARED TO DO ANYTHING TO END YOUR LIFE?: YES
2. HAVE YOU ACTUALLY HAD ANY THOUGHTS OF KILLING YOURSELF?: NO
TOTAL  NUMBER OF PREPARATORY ACTS LIFETIME: 1
MOST RECENT DATE: 67103
1. IN THE PAST MONTH, HAVE YOU WISHED YOU WERE DEAD OR WISHED YOU COULD GO TO SLEEP AND NOT WAKE UP?: NO
5. HAVE YOU STARTED TO WORK OUT OR WORKED OUT THE DETAILS OF HOW TO KILL YOURSELF? DO YOU INTEND TO CARRY OUT THIS PLAN?: YES
LETHALITY/MEDICAL DAMAGE CODE MOST RECENT POTENTIAL ATTEMPT: BEHAVIOR LIKELY TO RESULT IN DEATH DESPITE AVAILABLE MEDICAL CARE
4. HAVE YOU HAD THESE THOUGHTS AND HAD SOME INTENTION OF ACTING ON THEM?: NO
6. HAVE YOU EVER DONE ANYTHING, STARTED TO DO ANYTHING, OR PREPARED TO DO ANYTHING TO END YOUR LIFE?: YES
TOTAL  NUMBER OF PREPARATORY ACTS PAST 3 MONTHS: 1
ATTEMPT LIFETIME: YES
TOTAL  NUMBER OF ABORTED OR SELF INTERRUPTED ATTEMPTS LIFETIME: NO
ATTEMPT PAST THREE MONTHS: YES
TOTAL  NUMBER OF ACTUAL ATTEMPTS PAST 3 MONTHS: 1
LETHALITY/MEDICAL DAMAGE CODE MOST RECENT ACTUAL ATTEMPT: MODERATE PHYSICAL DAMAGE, MEDICAL ATTENTION NEEDED
1. HAVE YOU WISHED YOU WERE DEAD OR WISHED YOU COULD GO TO SLEEP AND NOT WAKE UP?: YES
2. HAVE YOU ACTUALLY HAD ANY THOUGHTS OF KILLING YOURSELF?: YES
5. HAVE YOU STARTED TO WORK OUT OR WORKED OUT THE DETAILS OF HOW TO KILL YOURSELF? DO YOU INTEND TO CARRY OUT THIS PLAN?: NO
TOTAL  NUMBER OF INTERRUPTED ATTEMPTS LIFETIME: NO
4. HAVE YOU HAD THESE THOUGHTS AND HAD SOME INTENTION OF ACTING ON THEM?: YES
3. HAVE YOU BEEN THINKING ABOUT HOW YOU MIGHT KILL YOURSELF?: YES

## 2024-11-06 NOTE — PROGRESS NOTES
Transition Clinic Progress Note    Patient Name:   Chuckie Manzano Date: November 6, 2024          Service Type: Individual        VISIT TIME START: 1500      VISIT TIME END: 1600      Session Length:  TC Session Length: 60 (53+ Minutes)    Session #:  4    Attendees: TC Attendees: Client alone    Service Modality:  Service Modality: Video Visit:      Provider verified identity through the following two step process.  Patient provided:  Patient is known previously to provider    Telemedicine Visit: The patient's condition can be safely assessed and treated via synchronous audio and visual telemedicine encounter.      Reason for Telemedicine Visit: Patient convenience (e.g. access to timely appointments / distance to available provider)    Originating Site (Patient Location): Patient's home    Distant Site (Provider Location): [facility]    Consent:  The patient/guardian has verbally consented to: the potential risks and benefits of telemedicine (video visit) versus in person care; bill my insurance or make self-payment for services provided; and responsibility for payment of non-covered services.     Patient would like the video invitation sent by:  My Chart    Mode of Communication:  Video Conference via [facility]    Distant Location (Provider):  Off-site    As the provider I attest to compliance with applicable laws and regulations related to telemedicine.    Informed Consent and Assessment Methods    This provider and patient discussed HIPAA, and the limits of confidentiality; including mandated reporting, the possibility of collaborative discussions with patient's primary care provider and the multidisciplinary team in the MH clinic during consultation.  Discussed the no show policy, and the benefits and possible unintended consequences of therapy.  Patient indicated understanding Transition Clinic services are short term, solution focused, until a referral  can be made and patient can bridge to long term therapy.  Patient verbally indicated understanding the informed consent.        DATA  Interactive Complexity: No  Crisis: No        Progress Since Last Session (Related to Symptoms / Goals / Homework):   Symptoms: Improving overall improvement  Homework: Completed in session      Episode of Care Goals: Achieved / completed to satisfaction - PREPARATION (Decided to change - considering how); Intervened by negotiating a change plan and determining options / strategies for behavior change, identifying triggers, exploring social supports, and working towards setting a date to begin behavior change     Current / Ongoing Stressors and Concerns:   Pt reported trip to [place], [place]. Pt reported positive experience - realized he needs to increase his connection to others. PT experiencing multiple external stressors related to his suicide attempts - he seems to be managing the stressors well and endorsed reliance on his support sxs. [facility]. Pt is working closely with his PCP to increase physical health - looking into supplements to support nerve health. Denied SI, plan or intent since last session three weeks ago. Writer and pt diccussed IOP as way for pt to increase insight and connection to others. Follow up scheduled to complete DA update and refer to IOP.      Treatment Objective(s) Addressed in This Session:   Increase interest, engagement, and pleasure in doing things  Increase social connection     Intervention:   Solution Focused Brief Therapy   Brief Psychotherapy - discussed and prioritizing the most efficient treatment., Existential Therapy - Learning what it means to be human.  Goal of therapy is to expand self-awareness and increase choice potentials., and Person-Centered Therapy - Actualizes potential and moves towards increased awareness, spontaneity, trust in self and inner directedness.    Assessments  completed prior to visit:  The following assessments were completed by patient for this visit:  PROMIS 10-Global Health (only subscores and total score):       9/25/2024     9:00 AM 10/11/2024     7:32 AM 11/6/2024     3:00 PM   PROMIS-10 Scores Only   Global Mental Health Score 11 10 12   Global Physical Health Score 16 12 16   PROMIS TOTAL - SUBSCORES 27 22 28     Assessments completed prior to visit:  The following assessments were completed by patient for this visit:  [place] Suicide Severity Rating Scale (Short Version)      7/22/2024     7:47 AM 9/20/2024     2:35 AM 9/23/2024    12:27 PM 10/3/2024     7:00 AM 10/11/2024     8:57 AM 10/16/2024     4:00 PM 11/7/2024     7:00 AM   [place] Suicide Severity Rating (Short Version)   Q1 Wished to be Dead (Past Month) 0-->no 1-->yes 1-->yes  0-->no     Q2 Suicidal Thoughts (Past Month) 0-->no 1-->yes 1-->yes  0-->no     Q3 Suicidal Thought Method  1-->yes 1-->yes       Q4 Suicidal Intent without Specific Plan   1-->yes       Q5 Suicide Intent with Specific Plan  1-->yes 1-->yes       Q6 Suicide Behavior (Lifetime) 0-->no 1-->yes   1-->yes     If yes to Q6, within past 3 months?  1-->yes 1-->yes  1-->yes     Level of Risk per Screen no risks indicated high risk high risk  high risk     1. Wish to be Dead (Since Last Contact)    N  N N   2. Non-Specific Active Suicidal Thoughts (Since Last Contact)    N  N N   Actual Attempt (Since Last Contact)      N N   Has subject engaged in non-suicidal self-injurious behavior? (Since Last Contact)      N N   Interrupted Attempts (Since Last Contact)      N N   Aborted or Self-Interrupted Attempt (Since Last Contact)      N N   Preparatory Acts or Behavior (Since Last Contact)      N N   Suicide (Since Last Contact)      N N   Most Lethal Attempt Date      9/20/2024 9/20/2024   Actual Lethality/Medical Damage Code (Most Lethal Attempt)      2 2   Calculated C-SSRS Risk Score (Since Last Contact)    No Risk Indicated  No Risk  Indicated No Risk Indicated         ASSESSMENT: Current Emotional / Mental Status (status of significant symptoms):   Risk status (Self / Other harm or suicidal ideation)   Patient denies current fears or concerns for personal safety.   Patient reports the following current or recent suicidal ideation or behaviors: Pt had suicide attempt on 9/20/2024. Denied SI, plan or intent since last session three weeks ago.   Patient denies current or recent homicidal ideation or behaviors.   Patient denies current or recent self injurious behavior or ideation.   Patient denies other safety concerns.   Patient reports there has been no change in risk factors since their last session.     Patient reports there has been no change in protective factors since their last session.     Recommended that patient call 911 or go to the local ED should there be a change in any of these risk factors     Appearance:   Appropriate    Eye Contact:   Good    Psychomotor Behavior: Normal    Attitude:   Cooperative    Orientation:   All   Speech    Rate / Production: Normal     Volume:  Normal    Mood:    Normal   Affect:    Appropriate    Thought Content:  Clear    Thought Form:  Coherent  Logical    Insight:    Fair      Medication Review:   No changes to current psychiatric medication(s)     Medication Compliance:   Yes     Changes in Health Issues:   None reported     Chemical Use Review:   Substance Use: Chemical use reviewed, no active concerns identified      Tobacco Use: No current tobacco use.      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    PLAN: (Patient Tasks / Therapist Tasks / Other)  -RTC in one week for DA update for programmatic care    Is this the patient's last discharge?: No    Procedure Code: Psychotherapy (with patient) - 60 [CPT 03869]      ANASTASIA Garcia, AUGIE on 11/7/2024 at 7:42  AM

## 2024-11-07 ASSESSMENT — COLUMBIA-SUICIDE SEVERITY RATING SCALE - C-SSRS
TOTAL  NUMBER OF ABORTED OR SELF INTERRUPTED ATTEMPTS SINCE LAST CONTACT: NO
TOTAL  NUMBER OF INTERRUPTED ATTEMPTS SINCE LAST CONTACT: NO
6. HAVE YOU EVER DONE ANYTHING, STARTED TO DO ANYTHING, OR PREPARED TO DO ANYTHING TO END YOUR LIFE?: NO
2. HAVE YOU ACTUALLY HAD ANY THOUGHTS OF KILLING YOURSELF?: NO
1. SINCE LAST CONTACT, HAVE YOU WISHED YOU WERE DEAD OR WISHED YOU COULD GO TO SLEEP AND NOT WAKE UP?: NO
SUICIDE, SINCE LAST CONTACT: NO
MOST LETHAL DATE: 67103
ATTEMPT SINCE LAST CONTACT: NO
LETHALITY/MEDICAL DAMAGE CODE MOST LETHAL ACTUAL ATTEMPT: MODERATE PHYSICAL DAMAGE, MEDICAL ATTENTION NEEDED

## 2024-11-12 ENCOUNTER — TELEPHONE (OUTPATIENT)
Dept: BEHAVIORAL HEALTH | Facility: CLINIC | Age: 29
End: 2024-11-12
Payer: COMMERCIAL

## 2024-11-12 NOTE — TELEPHONE ENCOUNTER
Pt reminder call for 11/13 TC appointment. Appointment confirmed.    Jammie Salazar  11/12/2024  [phone]

## 2024-11-13 ENCOUNTER — VIRTUAL VISIT (OUTPATIENT)
Dept: BEHAVIORAL HEALTH | Facility: CLINIC | Age: 29
End: 2024-11-13
Payer: COMMERCIAL

## 2024-11-13 DIAGNOSIS — F43.9 TRAUMA AND STRESSOR-RELATED DISORDER: Primary | ICD-10-CM

## 2024-11-13 PROCEDURE — 99207 PR NO CHARGE LOS: CPT | Mod: 95

## 2024-11-13 ASSESSMENT — COLUMBIA-SUICIDE SEVERITY RATING SCALE - C-SSRS
4. HAVE YOU HAD THESE THOUGHTS AND HAD SOME INTENTION OF ACTING ON THEM?: NO
1. IN THE PAST MONTH, HAVE YOU WISHED YOU WERE DEAD OR WISHED YOU COULD GO TO SLEEP AND NOT WAKE UP?: NO
LETHALITY/MEDICAL DAMAGE CODE MOST LETHAL ACTUAL ATTEMPT: MODERATE PHYSICAL DAMAGE, MEDICAL ATTENTION NEEDED
ATTEMPT PAST THREE MONTHS: YES
5. HAVE YOU STARTED TO WORK OUT OR WORKED OUT THE DETAILS OF HOW TO KILL YOURSELF? DO YOU INTEND TO CARRY OUT THIS PLAN?: NO
6. HAVE YOU EVER DONE ANYTHING, STARTED TO DO ANYTHING, OR PREPARED TO DO ANYTHING TO END YOUR LIFE?: YES
3. HAVE YOU BEEN THINKING ABOUT HOW YOU MIGHT KILL YOURSELF?: YES
1. HAVE YOU WISHED YOU WERE DEAD OR WISHED YOU COULD GO TO SLEEP AND NOT WAKE UP?: YES
TOTAL  NUMBER OF ABORTED OR SELF INTERRUPTED ATTEMPTS LIFETIME: NO
TOTAL  NUMBER OF ACTUAL ATTEMPTS PAST 3 MONTHS: 1
2. HAVE YOU ACTUALLY HAD ANY THOUGHTS OF KILLING YOURSELF?: NO
TOTAL  NUMBER OF PREPARATORY ACTS LIFETIME: 1
6. HAVE YOU EVER DONE ANYTHING, STARTED TO DO ANYTHING, OR PREPARED TO DO ANYTHING TO END YOUR LIFE?: YES
2. HAVE YOU ACTUALLY HAD ANY THOUGHTS OF KILLING YOURSELF?: YES
LETHALITY/MEDICAL DAMAGE CODE MOST RECENT POTENTIAL ATTEMPT: BEHAVIOR LIKELY TO RESULT IN DEATH DESPITE AVAILABLE MEDICAL CARE
5. HAVE YOU STARTED TO WORK OUT OR WORKED OUT THE DETAILS OF HOW TO KILL YOURSELF? DO YOU INTEND TO CARRY OUT THIS PLAN?: YES
LETHALITY/MEDICAL DAMAGE CODE MOST RECENT ACTUAL ATTEMPT: MODERATE PHYSICAL DAMAGE, MEDICAL ATTENTION NEEDED
TOTAL  NUMBER OF PREPARATORY ACTS PAST 3 MONTHS: 1
ATTEMPT LIFETIME: YES
TOTAL  NUMBER OF ACTUAL ATTEMPTS LIFETIME: 1
MOST LETHAL DATE: 67103
4. HAVE YOU HAD THESE THOUGHTS AND HAD SOME INTENTION OF ACTING ON THEM?: YES
TOTAL  NUMBER OF INTERRUPTED ATTEMPTS LIFETIME: NO
MOST RECENT DATE: 67103

## 2024-11-13 ASSESSMENT — PATIENT HEALTH QUESTIONNAIRE - PHQ9
10. IF YOU CHECKED OFF ANY PROBLEMS, HOW DIFFICULT HAVE THESE PROBLEMS MADE IT FOR YOU TO DO YOUR WORK, TAKE CARE OF THINGS AT HOME, OR GET ALONG WITH OTHER PEOPLE: SOMEWHAT DIFFICULT
SUM OF ALL RESPONSES TO PHQ QUESTIONS 1-9: 7
SUM OF ALL RESPONSES TO PHQ QUESTIONS 1-9: 7

## 2024-11-13 NOTE — PROGRESS NOTES
Transition Clinic Progress Note    Patient Name:   Chuckie Manzano Date: November 13, 2024          Service Type: Individual        VISIT TIME START: 1005      VISIT TIME END: 1100      Session Length:  TC Session Length: 60 (53+ Minutes)    Session #:  5    Attendees: TC Attendees: Client alone    Service Modality:  Service Modality: Video Visit:      Provider verified identity through the following two step process.  Patient provided:  Patient is known previously to provider    Telemedicine Visit: The patient's condition can be safely assessed and treated via synchronous audio and visual telemedicine encounter.      Reason for Telemedicine Visit: Patient convenience (e.g. access to timely appointments / distance to available provider)    Originating Site (Patient Location): Patient's home    Distant Site (Provider Location): [facility]    Consent:  The patient/guardian has verbally consented to: the potential risks and benefits of telemedicine (video visit) versus in person care; bill my insurance or make self-payment for services provided; and responsibility for payment of non-covered services.     Patient would like the video invitation sent by:  My Chart    Mode of Communication:  Video Conference via [facility]    Distant Location (Provider):  Off-site    As the provider I attest to compliance with applicable laws and regulations related to telemedicine.    Informed Consent and Assessment Methods    This provider and patient discussed HIPAA, and the limits of confidentiality; including mandated reporting, the possibility of collaborative discussions with patient's primary care provider and the multidisciplinary team in the MH clinic during consultation.  Discussed the no show policy, and the benefits and possible unintended consequences of therapy.  Patient indicated understanding Transition Clinic services are short term, solution focused, until a  "referral can be made and patient can bridge to long term therapy.  Patient verbally indicated understanding the informed consent.        DATA  Interactive Complexity: No  Crisis: No        Progress Since Last Session (Related to Symptoms / Goals / Homework):   Symptoms: No change pt remains stable since last session  Homework: Achieved / completed to satisfaction      Episode of Care Goals: Achieved / completed to satisfaction - PREPARATION (Decided to change - considering how); Intervened by negotiating a change plan and determining options / strategies for behavior change, identifying triggers, exploring social supports, and working towards setting a date to begin behavior change     Current / Ongoing Stressors and Concerns:   This session pt processed recent feelings of resentment towards mother related to upbringing. Per pt reporting, he was grounded \"a lot\" due to \"bad grades\" and \"wasn't able to do anything\" not developing strong social groups, feeling that he has always been \"behind\" as a result - feeling he didn't develop socially and then began engaging in unhealthy coping strategies using substances and masturbation as a substitute for social engagement and to soothe. Pt demonstrated increased insight today and willingness to engage in discomfort for the sake of improved mental health. Pt denied SI, plan or intent today or since last session.     Treatment Objective(s) Addressed in This Session:   Increase interest, engagement, and pleasure in doing things  Identify negative self-talk and behaviors: challenge core beliefs, myths, and actions  Review tx plan - consider IOP     Intervention:   Solution Focused Brief Therapy   Motivational Interviewing - helping to find the motivation to make positive behavior changes., Person-Centered Therapy - Actualizes potential and moves towards increased awareness, spontaneity, trust in self and inner directedness., and Psychodynamic - helping clients understand their " emotions and unconscious patterns of behavior.    Assessments completed prior to visit:  The following assessments were completed by patient for this visit:  [place] Suicide Severity Rating Scale (Short Version)      9/20/2024     2:35 AM 9/23/2024    12:27 PM 10/3/2024     7:00 AM 10/11/2024     8:57 AM 10/16/2024     4:00 PM 11/7/2024     7:00 AM 11/13/2024    10:00 AM   [place] Suicide Severity Rating (Short Version)   Q1 Wished to be Dead (Past Month) 1-->yes 1-->yes  0-->no      Q2 Suicidal Thoughts (Past Month) 1-->yes 1-->yes  0-->no      Q3 Suicidal Thought Method 1-->yes 1-->yes        Q4 Suicidal Intent without Specific Plan  1-->yes        Q5 Suicide Intent with Specific Plan 1-->yes 1-->yes        Q6 Suicide Behavior (Lifetime) 1-->yes   1-->yes      If yes to Q6, within past 3 months? 1-->yes 1-->yes  1-->yes      Level of Risk per Screen high risk high risk  high risk      1. Wish to be Dead (Since Last Contact)   N  N N    2. Non-Specific Active Suicidal Thoughts (Since Last Contact)   N  N N    Actual Attempt (Since Last Contact)     N N    Has subject engaged in non-suicidal self-injurious behavior? (Since Last Contact)     N N    Interrupted Attempts (Since Last Contact)     N N    Aborted or Self-Interrupted Attempt (Since Last Contact)     N N    Preparatory Acts or Behavior (Since Last Contact)     N N    Suicide (Since Last Contact)     N N    Most Lethal Attempt Date     9/20/2024 9/20/2024 9/20/2024   Actual Lethality/Medical Damage Code (Most Lethal Attempt)     2 2 2   Calculated C-SSRS Risk Score (Since Last Contact)   No Risk Indicated  No Risk Indicated No Risk Indicated          ASSESSMENT: Current Emotional / Mental Status (status of significant symptoms):   Risk status (Self / Other harm or suicidal ideation)   Patient denies current fears or concerns for personal safety.   Patient reports the following current or recent suicidal ideation or behaviors: Pt had suicide attempt on  9/20/2024; denied current SI, plan or intent today.   Patient denies current or recent homicidal ideation or behaviors.   Patient denies current or recent self injurious behavior or ideation.   Patient denies other safety concerns.   Patient reports there has been no change in risk factors since their last session.     Patient reports there has been no change in protective factors since their last session.     A safety and risk management plan has been developed including: Patient consented to co-developed safety plan on 9/23/2024.  Safety and risk management plan was reviewed.   Patient agreed to use safety plan should any safety concerns arise.  A copy was made available to the patient.     Appearance:   Appropriate    Eye Contact:   Good    Psychomotor Behavior: Normal    Attitude:   Cooperative    Orientation:   All   Speech    Rate / Production: Normal     Volume:  Normal    Mood:    Normal   Affect:    Appropriate    Thought Content:  Clear  Rumination    Thought Form:  Coherent  Logical    Insight:    Fair      Medication Review:   No changes to current psychiatric medication(s)     Medication Compliance:   Yes     Changes in Health Issues:   None reported     Chemical Use Review:   Substance Use: Chemical use reviewed, no active concerns identified      Tobacco Use: No current tobacco use.      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    PLAN: (Patient Tasks / Therapist Tasks / Other)  -Complete DA update for IOP on 11/15/2024    Is this the patient's last discharge?: No    Procedure Code: Psychotherapy (with patient) - 60 [CPT 75980]      AUGIE Garcia  November 13, 2024

## 2024-11-14 ENCOUNTER — TELEPHONE (OUTPATIENT)
Dept: BEHAVIORAL HEALTH | Facility: CLINIC | Age: 29
End: 2024-11-14
Payer: COMMERCIAL

## 2024-11-14 NOTE — TELEPHONE ENCOUNTER
Pt reminder call for 11/15 TC appointment. Appointment confirmed.    Jammie Salazar  11/14/2024  [phone]

## 2024-11-15 ENCOUNTER — TELEPHONE (OUTPATIENT)
Dept: BEHAVIORAL HEALTH | Facility: CLINIC | Age: 29
End: 2024-11-15
Payer: COMMERCIAL

## 2024-11-15 NOTE — TELEPHONE ENCOUNTER
Writer spoke with pt and rescheduled appointment for today as provider out to 11/19/2024 @ 7:30 am.    Jammie Salazar  11/15/2024  [phone]

## 2024-11-18 ENCOUNTER — TELEPHONE (OUTPATIENT)
Dept: BEHAVIORAL HEALTH | Facility: CLINIC | Age: 29
End: 2024-11-18
Payer: COMMERCIAL

## 2024-11-18 NOTE — TELEPHONE ENCOUNTER
Reached patient who asked if there were any appt later in day. Appt moved to later slot for appt that just cancelled.    Chana Andres  Transition Clinic Coordinator  11/18/24 1:03 PM

## 2024-11-19 ENCOUNTER — VIRTUAL VISIT (OUTPATIENT)
Dept: BEHAVIORAL HEALTH | Facility: CLINIC | Age: 29
End: 2024-11-19
Payer: COMMERCIAL

## 2024-11-19 DIAGNOSIS — F43.9 TRAUMA AND STRESSOR-RELATED DISORDER: Primary | ICD-10-CM

## 2024-11-19 PROCEDURE — 99207 PR NO CHARGE LOS: CPT | Mod: 95

## 2024-11-19 ASSESSMENT — COLUMBIA-SUICIDE SEVERITY RATING SCALE - C-SSRS
6. HAVE YOU EVER DONE ANYTHING, STARTED TO DO ANYTHING, OR PREPARED TO DO ANYTHING TO END YOUR LIFE?: NO
LETHALITY/MEDICAL DAMAGE CODE MOST LETHAL ACTUAL ATTEMPT: MODERATE PHYSICAL DAMAGE, MEDICAL ATTENTION NEEDED
3. HAVE YOU BEEN THINKING ABOUT HOW YOU MIGHT KILL YOURSELF?: YES
ATTEMPT LIFETIME: YES
MOST LETHAL DATE: 67103
2. HAVE YOU ACTUALLY HAD ANY THOUGHTS OF KILLING YOURSELF?: NO
1. IN THE PAST MONTH, HAVE YOU WISHED YOU WERE DEAD OR WISHED YOU COULD GO TO SLEEP AND NOT WAKE UP?: NO
4. HAVE YOU HAD THESE THOUGHTS AND HAD SOME INTENTION OF ACTING ON THEM?: NO
SUICIDE, SINCE LAST CONTACT: NO
LETHALITY/MEDICAL DAMAGE CODE MOST LETHAL ACTUAL ATTEMPT: MODERATE PHYSICAL DAMAGE, MEDICAL ATTENTION NEEDED
MOST LETHAL DATE: 67103
5. HAVE YOU STARTED TO WORK OUT OR WORKED OUT THE DETAILS OF HOW TO KILL YOURSELF? DO YOU INTEND TO CARRY OUT THIS PLAN?: YES
6. HAVE YOU EVER DONE ANYTHING, STARTED TO DO ANYTHING, OR PREPARED TO DO ANYTHING TO END YOUR LIFE?: YES
MOST RECENT DATE: 67103
ATTEMPT PAST THREE MONTHS: YES
TOTAL  NUMBER OF ACTUAL ATTEMPTS PAST 3 MONTHS: 1
ATTEMPT SINCE LAST CONTACT: NO
5. HAVE YOU STARTED TO WORK OUT OR WORKED OUT THE DETAILS OF HOW TO KILL YOURSELF? DO YOU INTEND TO CARRY OUT THIS PLAN?: NO
2. HAVE YOU ACTUALLY HAD ANY THOUGHTS OF KILLING YOURSELF?: YES
TOTAL  NUMBER OF ABORTED OR SELF INTERRUPTED ATTEMPTS SINCE LAST CONTACT: NO
1. HAVE YOU WISHED YOU WERE DEAD OR WISHED YOU COULD GO TO SLEEP AND NOT WAKE UP?: YES
1. SINCE LAST CONTACT, HAVE YOU WISHED YOU WERE DEAD OR WISHED YOU COULD GO TO SLEEP AND NOT WAKE UP?: NO
TOTAL  NUMBER OF PREPARATORY ACTS LIFETIME: 1
TOTAL  NUMBER OF INTERRUPTED ATTEMPTS LIFETIME: NO
TOTAL  NUMBER OF ACTUAL ATTEMPTS LIFETIME: 1
LETHALITY/MEDICAL DAMAGE CODE MOST RECENT ACTUAL ATTEMPT: MODERATE PHYSICAL DAMAGE, MEDICAL ATTENTION NEEDED
TOTAL  NUMBER OF PREPARATORY ACTS PAST 3 MONTHS: 1
2. HAVE YOU ACTUALLY HAD ANY THOUGHTS OF KILLING YOURSELF?: NO
LETHALITY/MEDICAL DAMAGE CODE MOST RECENT POTENTIAL ATTEMPT: BEHAVIOR LIKELY TO RESULT IN DEATH DESPITE AVAILABLE MEDICAL CARE
TOTAL  NUMBER OF ABORTED OR SELF INTERRUPTED ATTEMPTS LIFETIME: NO
TOTAL  NUMBER OF INTERRUPTED ATTEMPTS SINCE LAST CONTACT: NO
6. HAVE YOU EVER DONE ANYTHING, STARTED TO DO ANYTHING, OR PREPARED TO DO ANYTHING TO END YOUR LIFE?: YES
4. HAVE YOU HAD THESE THOUGHTS AND HAD SOME INTENTION OF ACTING ON THEM?: YES

## 2024-11-19 NOTE — PROGRESS NOTES
Transition Clinic Progress Note    Patient Name:   Chuckie Manzano Date: November 19, 2024          Service Type: Individual        VISIT TIME START: 1003      VISIT TIME END: 1053      Session Length:  TC Session Length: 45 (38-52 Minutes)    Session #:  6    Attendees: TC Attendees: Client alone    Service Modality:  Service Modality: Video Visit:      Provider verified identity through the following two step process.  Patient provided:  Patient is known previously to provider    Telemedicine Visit: The patient's condition can be safely assessed and treated via synchronous audio and visual telemedicine encounter.      Reason for Telemedicine Visit: Patient convenience (e.g. access to timely appointments / distance to available provider)    Originating Site (Patient Location): Patient's home    Distant Site (Provider Location): [facility]    Consent:  The patient/guardian has verbally consented to: the potential risks and benefits of telemedicine (video visit) versus in person care; bill my insurance or make self-payment for services provided; and responsibility for payment of non-covered services.     Patient would like the video invitation sent by:  My Chart    Mode of Communication:  Video Conference via [facility]    Distant Location (Provider):  Off-site    As the provider I attest to compliance with applicable laws and regulations related to telemedicine.    Informed Consent and Assessment Methods    This provider and patient discussed HIPAA, and the limits of confidentiality; including mandated reporting, the possibility of collaborative discussions with patient's primary care provider and the multidisciplinary team in the MH clinic during consultation.  Discussed the no show policy, and the benefits and possible unintended consequences of therapy.  Patient indicated understanding Transition Clinic services are short term, solution focused, until a  referral can be made and patient can bridge to long term therapy.  Patient verbally indicated understanding the informed consent.        DATA  Interactive Complexity: No  Crisis: No        Progress Since Last Session (Related to Symptoms / Goals / Homework):   Symptoms: Improving pt reported dramatic increase in hopefulness since last session  Homework: Achieved / completed to satisfaction      Episode of Care Goals: Achieved / completed to satisfaction - PREPARATION (Decided to change - considering how); Intervened by negotiating a change plan and determining options / strategies for behavior change, identifying triggers, exploring social supports, and working towards setting a date to begin behavior change     Current / Ongoing Stressors and Concerns:   Pt reported he believes he found a potential solution to his problem via supplements. This has increased pt's optimism for the future and increased feelings of hopefulness. Discussed pros/cons, potential for failure. Requested every-other week appointments. Denied safety concerns today; denied SI, plan or intent since last session.     Treatment Objective(s) Addressed in This Session:   use cognitive strategies identified in therapy to challenge anxious thoughts  Increase interest, engagement, and pleasure in doing things     Intervention:   Solution Focused Brief Therapy   Motivational Interviewing - helping to find the motivation to make positive behavior changes., Person-Centered Therapy - Actualizes potential and moves towards increased awareness, spontaneity, trust in self and inner directedness., and Psychodynamic - helping clients understand their emotions and unconscious patterns of behavior.      Assessments completed prior to visit:  The following assessments were completed by patient for this visit:  [place] Suicide Severity Rating Scale (Short Version)      10/3/2024     7:00 AM 10/11/2024     8:57 AM 10/16/2024     4:00 PM 11/7/2024     7:00 AM  11/13/2024    10:00 AM 11/19/2024     9:00 AM 11/19/2024    10:00 AM   [place] Suicide Severity Rating (Short Version)   Q1 Wished to be Dead (Past Month)  0-->no        Q2 Suicidal Thoughts (Past Month)  0-->no        Q6 Suicide Behavior (Lifetime)  1-->yes        If yes to Q6, within past 3 months?  1-->yes        Level of Risk per Screen  high risk        1. Wish to be Dead (Since Last Contact) N  N N   N   2. Non-Specific Active Suicidal Thoughts (Since Last Contact) N  N N   N   Actual Attempt (Since Last Contact)   N N   N   Has subject engaged in non-suicidal self-injurious behavior? (Since Last Contact)   N N   N   Interrupted Attempts (Since Last Contact)   N N   N   Aborted or Self-Interrupted Attempt (Since Last Contact)   N N   N   Preparatory Acts or Behavior (Since Last Contact)   N N   N   Suicide (Since Last Contact)   N N   N   Most Lethal Attempt Date   9/20/2024 9/20/2024 9/20/2024 9/20/2024 9/20/2024   Actual Lethality/Medical Damage Code (Most Lethal Attempt)   2 2 2 2 2   Calculated C-SSRS Risk Score (Since Last Contact) No Risk Indicated  No Risk Indicated No Risk Indicated   No Risk Indicated         ASSESSMENT: Current Emotional / Mental Status (status of significant symptoms):   Risk status (Self / Other harm or suicidal ideation)   Patient denies current fears or concerns for personal safety.   Patient denies current or recent suicidal ideation or behaviors.   Patient denies current or recent homicidal ideation or behaviors.   Patient denies current or recent self injurious behavior or ideation.   Patient denies other safety concerns.   Patient reports there has been no change in risk factors since their last session.     Patient reports there has been a chance in protective factors since their last session.  Increased hopefulness due to recent insights and information    Recommended that patient call 911 or go to the local ED should there be a change in any of these risk  factors     Appearance:   Appropriate    Eye Contact:   Good    Psychomotor Behavior: Normal    Attitude:   Cooperative    Orientation:   All   Speech    Rate / Production: Normal     Volume:  Normal    Mood:    Normal   Affect:    Appropriate    Thought Content:  Clear    Thought Form:  Coherent  Logical    Insight:    Fair      Medication Review:   No changes to current psychiatric medication(s)     Medication Compliance:   Yes     Changes in Health Issues:   None reported     Chemical Use Review:   Substance Use: Chemical use reviewed, no active concerns identified      Tobacco Use: No current tobacco use.      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    PLAN: (Patient Tasks / Therapist Tasks / Other)  -RTC in 1-2 weeks    Is this the patient's last discharge?: No    Procedure Code: Psychotherapy (with patient) - 45 [CPT 19155]      Keila Perla LPCC, LADC  November 19, 2024

## 2024-12-03 ENCOUNTER — TELEPHONE (OUTPATIENT)
Dept: BEHAVIORAL HEALTH | Facility: CLINIC | Age: 29
End: 2024-12-03
Payer: COMMERCIAL

## 2024-12-03 NOTE — TELEPHONE ENCOUNTER
Pt reminder call for 12/04 TC appointment. VM left with TC info.     Jammie Salazar  12/03/2024  [phone]

## 2024-12-03 NOTE — TELEPHONE ENCOUNTER
Writer spoke with pt and rescheduled TC therapy appointment on 12/11/2024 @ 3:00 pm as provider out sick tomorrow.    Jammie Salazar  12/03/2024  [phone]

## 2024-12-11 ENCOUNTER — OFFICE VISIT (OUTPATIENT)
Dept: BEHAVIORAL HEALTH | Facility: CLINIC | Age: 29
End: 2024-12-11
Payer: COMMERCIAL

## 2024-12-11 DIAGNOSIS — F43.9 TRAUMA AND STRESSOR-RELATED DISORDER: Primary | ICD-10-CM

## 2024-12-11 PROCEDURE — 99207 PR NO CHARGE LOS: CPT

## 2024-12-11 ASSESSMENT — COLUMBIA-SUICIDE SEVERITY RATING SCALE - C-SSRS
LETHALITY/MEDICAL DAMAGE CODE MOST RECENT ACTUAL ATTEMPT: MODERATE PHYSICAL DAMAGE, MEDICAL ATTENTION NEEDED
1. IN THE PAST MONTH, HAVE YOU WISHED YOU WERE DEAD OR WISHED YOU COULD GO TO SLEEP AND NOT WAKE UP?: NO
ATTEMPT LIFETIME: YES
TOTAL  NUMBER OF ACTUAL ATTEMPTS PAST 3 MONTHS: 1
TOTAL  NUMBER OF INTERRUPTED ATTEMPTS LIFETIME: NO
1. HAVE YOU WISHED YOU WERE DEAD OR WISHED YOU COULD GO TO SLEEP AND NOT WAKE UP?: YES
TOTAL  NUMBER OF ABORTED OR SELF INTERRUPTED ATTEMPTS LIFETIME: NO
4. HAVE YOU HAD THESE THOUGHTS AND HAD SOME INTENTION OF ACTING ON THEM?: YES
LETHALITY/MEDICAL DAMAGE CODE FIRST ACTUAL ATTEMPT: MODERATE PHYSICAL DAMAGE, MEDICAL ATTENTION NEEDED
TOTAL  NUMBER OF PREPARATORY ACTS LIFETIME: 1
6. HAVE YOU EVER DONE ANYTHING, STARTED TO DO ANYTHING, OR PREPARED TO DO ANYTHING TO END YOUR LIFE?: YES
ATTEMPT PAST THREE MONTHS: YES
3. HAVE YOU BEEN THINKING ABOUT HOW YOU MIGHT KILL YOURSELF?: YES
LETHALITY/MEDICAL DAMAGE CODE MOST LETHAL POTENTIAL ATTEMPT: BEHAVIOR LIKELY TO RESULT IN DEATH DESPITE AVAILABLE MEDICAL CARE
LETHALITY/MEDICAL DAMAGE CODE MOST RECENT POTENTIAL ATTEMPT: BEHAVIOR LIKELY TO RESULT IN DEATH DESPITE AVAILABLE MEDICAL CARE
4. HAVE YOU HAD THESE THOUGHTS AND HAD SOME INTENTION OF ACTING ON THEM?: NO
2. HAVE YOU ACTUALLY HAD ANY THOUGHTS OF KILLING YOURSELF?: YES
2. HAVE YOU ACTUALLY HAD ANY THOUGHTS OF KILLING YOURSELF?: NO
MOST RECENT DATE: 67103
FIRST ATTEMPT DATE: 67103
LETHALITY/MEDICAL DAMAGE CODE FIRST POTENTIAL ATTEMPT: BEHAVIOR LIKELY TO RESULT IN DEATH DESPITE AVAILABLE MEDICAL CARE
TOTAL  NUMBER OF ACTUAL ATTEMPTS LIFETIME: 1
MOST LETHAL DATE: 67103
5. HAVE YOU STARTED TO WORK OUT OR WORKED OUT THE DETAILS OF HOW TO KILL YOURSELF? DO YOU INTEND TO CARRY OUT THIS PLAN?: NO
6. HAVE YOU EVER DONE ANYTHING, STARTED TO DO ANYTHING, OR PREPARED TO DO ANYTHING TO END YOUR LIFE?: YES
TOTAL  NUMBER OF PREPARATORY ACTS PAST 3 MONTHS: 1
LETHALITY/MEDICAL DAMAGE CODE MOST LETHAL ACTUAL ATTEMPT: MODERATE PHYSICAL DAMAGE, MEDICAL ATTENTION NEEDED
5. HAVE YOU STARTED TO WORK OUT OR WORKED OUT THE DETAILS OF HOW TO KILL YOURSELF? DO YOU INTEND TO CARRY OUT THIS PLAN?: YES

## 2024-12-11 NOTE — PROGRESS NOTES
"    Transition Clinic Progress Note    Patient Name:   Chuckie Manzano Date: December 11, 2024          Service Type: Individual        VISIT TIME START: 1500      VISIT TIME END: 1600      Session Length:  TC Session Length: 60 (53+ Minutes)    Session #:  7    Attendees: TC Attendees: Client alone    Service Modality:  Service Modality: In-Person    Informed Consent and Assessment Methods    This provider and patient discussed HIPAA, and the limits of confidentiality; including mandated reporting, the possibility of collaborative discussions with patient's primary care provider and the multidisciplinary team in the  clinic during consultation.  Discussed the no show policy, and the benefits and possible unintended consequences of therapy.  Patient indicated understanding Transition Clinic services are short term, solution focused, until a referral can be made and patient can bridge to long term therapy.  Patient verbally indicated understanding the informed consent.        DATA  Interactive Complexity: No  Crisis: No        Progress Since Last Session (Related to Symptoms / Goals / Homework):   Symptoms: Improving pt endorsed overall improvement in mood and outlook  Homework: Completed in session      Episode of Care Goals: Achieved / completed to satisfaction - PREPARATION (Decided to change - considering how); Intervened by negotiating a change plan and determining options / strategies for behavior change, identifying triggers, exploring social supports, and working towards setting a date to begin behavior change     Current / Ongoing Stressors and Concerns:   Pt came to the session today stating he was feeling \"so good.\" He is feeling positive about his prognosis and believes he has found a solution to his physical pelvic discomfort. Interviewed for sales position. Upcoming trip to [place] to see family. Pt endorsed several future endeavors that bring him hopefulness and optimism. Pt stated multiple times " that he is very excited to have found a solution.     Writer informed pt of her upcoming departure from [place]. Discussed long-term therapy options with pt, who decided he feels he's currently in a good enough place psychologically to take some time off from therapy. Writer provided pt with TC contact information and encouraged pt to utilize TC in the future for urgent mental health needs or for future long-term therapy referral if needed.      Treatment Objective(s) Addressed in This Session:   Identify negative self-talk and behaviors: challenge core beliefs, myths, and actions  Discuss termination and offer referrals   Review progress made towards goals     Intervention:   Solution Focused Brief Therapy   Brief Psychotherapy - discussed and prioritizing the most efficient treatment., Motivational Interviewing - helping to find the motivation to make positive behavior changes., Person-Centered Therapy - Actualizes potential and moves towards increased awareness, spontaneity, trust in self and inner directedness., and Psychodynamic - helping clients understand their emotions and unconscious patterns of behavior.    Assessments completed prior to visit:  The following assessments were completed by patient for this visit:  PROMIS 10-Global Health (only subscores and total score):       9/25/2024     9:00 AM 10/11/2024     7:32 AM 11/6/2024     3:00 PM 12/11/2024     4:00 PM   PROMIS-10 Scores Only   Global Mental Health Score 11 10 12 15   Global Physical Health Score 16 12 16 18   PROMIS TOTAL - SUBSCORES 27 22 28 33     [place] Suicide Severity Rating Scale (Lifetime/Recent)      10/16/2024     4:00 PM 11/6/2024     3:00 PM 11/7/2024     7:00 AM 11/13/2024    10:00 AM 11/19/2024     9:00 AM 11/19/2024    10:00 AM 12/11/2024     4:00 PM   [place] Suicide Severity Rating (Lifetime/Recent)   Q1 Wish to be Dead (Lifetime)  Y  Y Y  Y   1. Wish to be Dead (Past 1 Month)  N  N N  N   Q2 Non-Specific Active Suicidal  Thoughts (Lifetime)  Y  Y Y  Y   2. Non-Specific Active Suicidal Thoughts (Past 1 Month)  N  N N  N   3. Active Suicidal Ideation with any Methods (Not Plan) Without Intent to Act (Lifetime)  Y  Y Y  Y   Q3 Active Suicidal Ideation with any Methods (Not Plan) Without Intent to Act (Past 1 Month)  N  N N  N   Q4 Active Suicidal Ideation with Some Intent to Act, Without Specific Plan (Lifetime)  Y  Y Y  Y   4. Active Suicidal Ideation with Some Intent to Act, Without Specific Plan (Past 1 Month)  N  N N  N   Q5 Active Suicidal Ideation with Specific Plan and Intent (Lifetime)  Y  Y Y  Y   5. Active Suicidal Ideation with Specific Plan and Intent (Past 1 Month)  N  N N  N   Actual Attempt (Lifetime)  Y  Y Y  Y   Total Number of Actual Attempts (Lifetime)  1  1 1  1   Actual Attempt (Past 3 Months)  Y  Y Y  Y   Total Number of Actual Attempts (Past 3 Months)  1  1 1  1   Has subject engaged in non-suicidal self-injurious behavior? (Lifetime)  N  N N  N   Interrupted Attempts (Lifetime)  N  N N  N   Aborted or Self-Interrupted Attempt (Lifetime)  N  N N  N   Preparatory Acts or Behavior (Lifetime)  Y  Y Y  Y   Total Number of Preparatory Acts (Lifetime)  1  1 1  1   Preparatory Acts or Behavior (Past 3 Months)  Y  Y Y  Y   Total Number of Preparatory Acts (Past 3 Months)  1  1 1  1   Most Recent Attempt Date  9/20/2024 9/20/2024 9/20/2024 9/20/2024   Actual Lethality/Medical Damage Code (Most Recent Attempt)  2  2 2  2   Potential Lethality Code (Most Recent Attempt)  2  2 2  2   Most Lethal Attempt Date 9/20/2024 9/20/2024 9/20/2024 9/20/2024 9/20/2024 9/20/2024   Actual Lethality/Medical Damage Code (Most Lethal Attempt) 2  2 2 2 2 2   Potential Lethality Code (Most Lethal Attempt)       2   Initial/First Attempt Date       9/20/2024   Actual Lethality/Medical Damage Code (Initial/First Attempt)       2   Potential Lethality Code (Initial/First Attempt)       2   Calculated C-SSRS Risk Score (Lifetime/Recent)  High  Risk  High Risk High Risk  High Risk         ASSESSMENT: Current Emotional / Mental Status (status of significant symptoms):   Risk status (Self / Other harm or suicidal ideation)   Patient denies current fears or concerns for personal safety.   Patient denies current or recent suicidal ideation or behaviors.   Patient denies current or recent homicidal ideation or behaviors.   Patient denies current or recent self injurious behavior or ideation.   Patient denies other safety concerns.   Patient reports there has been no change in risk factors since their last session.     Patient reports there has been no change in protective factors since their last session.     Recommended that patient call 911 or go to the local ED should there be a change in any of these risk factors     Appearance:   Appropriate    Eye Contact:   Good    Psychomotor Behavior: Normal    Attitude:   Cooperative    Orientation:   All   Speech    Rate / Production: Normal     Volume:  Normal    Mood:    Normal   Affect:    Appropriate    Thought Content:  Clear    Thought Form:  Coherent  Logical    Insight:    Good      Medication Review:   No changes to current psychiatric medication(s)     Medication Compliance:   Yes     Changes in Health Issues:   None reported     Chemical Use Review:   Substance Use: Chemical use reviewed, no active concerns identified      Tobacco Use: No current tobacco use.      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    PLAN: (Patient Tasks / Therapist Tasks / Other)  -Discharge    Is this the patient's last discharge?: Yes. Reason for Discharge Completed goals / no longer needed Level of care: Other: None at this time    Procedure Code: Psychotherapy (with patient) - 60 [CPT 49087]      Keila Perla, [facility], LADC  December 11, 2024

## 2025-02-25 ENCOUNTER — OFFICE VISIT (OUTPATIENT)
Dept: OPHTHALMOLOGY | Facility: CLINIC | Age: 30
End: 2025-02-25
Payer: COMMERCIAL

## 2025-02-25 DIAGNOSIS — Q10.0 CONGENITAL PTOSIS OF LEFT EYELID: ICD-10-CM

## 2025-02-25 DIAGNOSIS — Q10.0 CONGENITAL PTOSIS, RIGHT: Primary | ICD-10-CM

## 2025-02-25 ASSESSMENT — CONF VISUAL FIELD
OS_NORMAL: 1
OD_SUPERIOR_NASAL_RESTRICTION: 0
OD_SUPERIOR_TEMPORAL_RESTRICTION: 0
OD_INFERIOR_NASAL_RESTRICTION: 0
OS_SUPERIOR_NASAL_RESTRICTION: 0
OS_SUPERIOR_TEMPORAL_RESTRICTION: 0
OS_INFERIOR_TEMPORAL_RESTRICTION: 0
OS_INFERIOR_NASAL_RESTRICTION: 0
OD_INFERIOR_TEMPORAL_RESTRICTION: 0
METHOD: COUNTING FINGERS
OD_NORMAL: 1

## 2025-02-25 ASSESSMENT — TONOMETRY
OD_IOP_MMHG: 12
OS_IOP_MMHG: 14
IOP_METHOD: ICARE

## 2025-02-25 ASSESSMENT — VISUAL ACUITY
OD_SC: 20/20
METHOD: SNELLEN - LINEAR
OS_SC: 20/20

## 2025-02-25 NOTE — NURSING NOTE
Chief Complaints and History of Present Illnesses   Patient presents with    Follow Up     Patient reports he is here for a follow up S/P Bilateral upper eyelid ptosis repair 7/22/2024.      Chief Complaint(s) and History of Present Illness(es)       Follow Up              Laterality: both eyes    Associated symptoms: dryness    Treatments tried: no treatments    Pain scale: 0/10    Comments: Patient reports he is here for a follow up S/P Bilateral upper eyelid ptosis repair 7/22/2024.               Comments    Feels like left eye does not look as natural as right eye.He states that lids are behaving differently when he is drinking the left eye will stay up high and RUL goes down low.   He reports that he is dealing with Chronic tension that has been an issue for the past 7-8 years. He is currently getting treatment for this with PT and was told this can lower to position of lids but can take 9 months to take effect. He states he hopes to assess the appearance at that point whiling he is healing but currently concerned with how lids are behaving.   Adamaris Rudd, COT COT 8:38 AM February 25, 2025           .

## 2025-02-25 NOTE — PROGRESS NOTES
"     Chief Complaint(s) and History of Present Illness(es)     Follow Up            Laterality: both eyes    Associated symptoms: dryness    Treatments tried: no treatments    Pain scale: 0/10    Comments: Patient reports he is here for a follow up S/P Bilateral upper   eyelid ptosis repair 7/22/2024.           Comments    Feels like left eye does not look as natural as right eye.He states that   lids are behaving differently when he is drinking the left eye will stay   up high and RUL goes down low.   He reports that he is dealing with Chronic tension that has been an issue   for the past 7-8 years. He is currently getting treatment for this with PT   and was told this can lower to position of lids but can take 9 months to   take effect. He states he hopes to assess the appearance at that point   whiling he is healing but currently concerned with how lids are behaving.     Adamaris Rudd, COT COT 8:38 AM February 25, 2025       .                  Assessment & Plan     Chuckie Manzano is a 30 year old male with the following diagnoses:     ICD-10-CM    1. 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 States that after consuming alcohol he notices left upper eyelid elevated substantially and thinks it is his muscle memory from lifting his left eyebrow from when it was lower. Is currently undergoing physical therapy for pelvic floor tension, which he was told could be contributing to \"irregular eyes\". Feels he may have been \"overcorrected\". The brow could potentially be addressed with botulinum toxin (would be self pay) but I strongly encourage him not to do anything for his lid position as he have very good symmetry.       -MRD 1: 3 mm each eye  -Peripheral visual field testing with 50 degrees of vision untapped in both eyes " (pre-operatively was 30/32)      PLAN:  -Re--assurance provided, from an objective standpoint ptosis repair has improved resting eyelid position, no lagophthalmos on exam.  -Follow up as needed         Cameron Agee MD  Ophthalmology PGY-2   [facility]         Attending Physician Attestation: Complete documentation of historical and exam elements from today's encounter can be found in the full encounter summary report (not reduplicated in this progress note). I personally obtained the chief complaint(s) and history of present illness. I confirmed and edited as necessary the review of systems, past medical/surgical history, family history, social history, and examination findings as documented by others; and I examined the patient myself. I personally reviewed the relevant tests, images, and reports as documented above. I formulated and edited as necessary the assessment and plan and discussed the findings and management plan with the patient.  -Ina Moura MD

## 2025-07-19 ENCOUNTER — HEALTH MAINTENANCE LETTER (OUTPATIENT)
Age: 30
End: 2025-07-19

## (undated) DEVICE — SOL WATER IRRIG 1000ML BOTTLE 07139-09

## (undated) DEVICE — ESU ELEC NDL 1" COATED/INSULATED E1465

## (undated) DEVICE — NDL 30GA 0.5" 305106

## (undated) DEVICE — SYR 03ML LL W/O NDL

## (undated) DEVICE — PACK MINOR EYE

## (undated) DEVICE — ESU PENCIL SMOKE EVAC W/ROCKER SWITCH 0703-047-000

## (undated) DEVICE — ESU EYE HIGH TEMP 65410-183

## (undated) DEVICE — GLOVE BIOGEL PI MICRO SZ 7.5 48575

## (undated) RX ORDER — ACETAMINOPHEN 325 MG/1
TABLET ORAL
Status: DISPENSED
Start: 2024-07-22

## (undated) RX ORDER — OXYCODONE HYDROCHLORIDE 5 MG/1
TABLET ORAL
Status: DISPENSED
Start: 2024-07-22

## (undated) RX ORDER — FENTANYL CITRATE 50 UG/ML
INJECTION, SOLUTION INTRAMUSCULAR; INTRAVENOUS
Status: DISPENSED
Start: 2024-07-22

## (undated) RX ORDER — TETRACAINE HYDROCHLORIDE 5 MG/ML
SOLUTION OPHTHALMIC
Status: DISPENSED
Start: 2024-07-22